# Patient Record
Sex: FEMALE | Race: WHITE
[De-identification: names, ages, dates, MRNs, and addresses within clinical notes are randomized per-mention and may not be internally consistent; named-entity substitution may affect disease eponyms.]

---

## 2017-03-15 ENCOUNTER — HOSPITAL ENCOUNTER (OUTPATIENT)
Dept: HOSPITAL 84 - D.CT | Age: 37
Discharge: HOME | End: 2017-03-15
Attending: FAMILY MEDICINE
Payer: MEDICARE

## 2017-03-15 VITALS — BODY MASS INDEX: 13.1 KG/M2

## 2017-03-15 DIAGNOSIS — R93.8: Primary | ICD-10-CM

## 2017-03-26 ENCOUNTER — HOSPITAL ENCOUNTER (EMERGENCY)
Dept: HOSPITAL 84 - D.ER | Age: 37
Discharge: HOME | End: 2017-03-26
Payer: MEDICARE

## 2017-03-26 VITALS — BODY MASS INDEX: 13.1 KG/M2

## 2017-03-26 DIAGNOSIS — E87.6: ICD-10-CM

## 2017-03-26 DIAGNOSIS — K52.9: Primary | ICD-10-CM

## 2017-03-26 LAB
ALBUMIN SERPL-MCNC: 4.5 G/DL (ref 3.4–5)
ALP SERPL-CCNC: 74 U/L (ref 46–116)
ALT SERPL-CCNC: 20 U/L (ref 10–68)
ANION GAP SERPL CALC-SCNC: 16.2 MMOL/L (ref 8–16)
APPEARANCE UR: (no result)
BACTERIA #/AREA URNS HPF: (no result) /HPF
BASOPHILS NFR BLD AUTO: 0.1 % (ref 0–2)
BILIRUB SERPL-MCNC: 1.91 MG/DL (ref 0.2–1.3)
BILIRUB SERPL-MCNC: NEGATIVE MG/DL
BUN SERPL-MCNC: 13 MG/DL (ref 7–18)
CALCIUM SERPL-MCNC: 9.2 MG/DL (ref 8.5–10.1)
CHLORIDE SERPL-SCNC: 103 MMOL/L (ref 98–107)
CO2 SERPL-SCNC: 26.2 MMOL/L (ref 21–32)
COLOR UR: YELLOW
CREAT SERPL-MCNC: 0.8 MG/DL (ref 0.6–1.3)
EOSINOPHIL NFR BLD: 0 % (ref 0–7)
ERYTHROCYTE [DISTWIDTH] IN BLOOD BY AUTOMATED COUNT: 12.5 % (ref 11.5–14.5)
GLOBULIN SER-MCNC: 3.3 G/L
GLUCOSE SERPL-MCNC: 148 MG/DL (ref 74–106)
GLUCOSE SERPL-MCNC: NEGATIVE MG/DL
HCG UR QL: NEGATIVE
HCT VFR BLD CALC: 39.2 % (ref 36–48)
HGB BLD-MCNC: 13.1 G/DL (ref 12–16)
IMM GRANULOCYTES NFR BLD: 0.2 % (ref 0–5)
KETONES UR STRIP-MCNC: (no result) MG/DL
LEUKOCYTE ESTERASE: (no result)
LYMPHOCYTES NFR BLD AUTO: 4.9 % (ref 15–50)
MCH RBC QN AUTO: 29.8 PG (ref 26–34)
MCHC RBC AUTO-ENTMCNC: 33.4 G/DL (ref 31–37)
MCV RBC: 89.1 FL (ref 80–100)
MONOCYTES NFR BLD: 2.4 % (ref 2–11)
MUCOUS THREADS #/AREA URNS LPF: (no result) /LPF
NEUTROPHILS NFR BLD AUTO: 92.4 % (ref 40–80)
NITRITE UR-MCNC: NEGATIVE MG/ML
OSMOLALITY SERPL CALC.SUM OF ELEC: 285 MOSM/KG (ref 275–300)
PH UR STRIP: 6 [PH] (ref 5–6)
PLATELET # BLD: 203 10X3/UL (ref 130–400)
PMV BLD AUTO: 13 FL (ref 7.4–10.4)
POTASSIUM SERPL-SCNC: 3.4 MMOL/L (ref 3.5–5.1)
PROT SERPL-MCNC: 7.8 G/DL (ref 6.4–8.2)
PROT UR-MCNC: (no result) MG/DL
RBC # BLD AUTO: 4.4 10X6/UL (ref 4–5.4)
RBC #/AREA URNS HPF: (no result) /HPF (ref 0–5)
SODIUM SERPL-SCNC: 142 MMOL/L (ref 136–145)
SP GR UR STRIP: 1.02 (ref 1–1.02)
UROBILINOGEN UR-MCNC: NORMAL MG/DL
WBC # BLD AUTO: 14 10X3/UL (ref 4.8–10.8)
WBC #/AREA URNS HPF: (no result) /HPF (ref 0–5)

## 2018-02-14 ENCOUNTER — HOSPITAL ENCOUNTER (EMERGENCY)
Dept: HOSPITAL 84 - D.ER | Age: 38
Discharge: HOME | End: 2018-02-14
Payer: MEDICARE

## 2018-02-14 VITALS — BODY MASS INDEX: 13.1 KG/M2

## 2018-02-14 DIAGNOSIS — J06.9: Primary | ICD-10-CM

## 2018-02-14 DIAGNOSIS — J20.9: ICD-10-CM

## 2018-02-18 ENCOUNTER — HOSPITAL ENCOUNTER (EMERGENCY)
Dept: HOSPITAL 84 - D.ER | Age: 38
Discharge: HOME | End: 2018-02-18
Payer: MEDICARE

## 2018-02-18 VITALS — BODY MASS INDEX: 13.1 KG/M2

## 2018-02-18 DIAGNOSIS — J11.1: Primary | ICD-10-CM

## 2018-02-18 LAB
ERYTHROCYTE [DISTWIDTH] IN BLOOD BY AUTOMATED COUNT: 12.8 % (ref 11.5–14.5)
HCT VFR BLD CALC: 40.4 % (ref 36–48)
HGB BLD-MCNC: 13.8 G/DL (ref 12–16)
LYMPHOCYTES NFR BLD AUTO: 20.6 % (ref 15–50)
MCH RBC QN AUTO: 29.7 PG (ref 26–34)
MCHC RBC AUTO-ENTMCNC: 34.2 G/DL (ref 31–37)
MCV RBC: 86.9 FL (ref 80–100)
NEUTROPHILS NFR BLD AUTO: 69.6 % (ref 40–80)
PLATELET # BLD: 114 10X3/UL (ref 130–400)
PMV BLD AUTO: 12.5 FL (ref 7.4–10.4)
RBC # BLD AUTO: 4.65 10X6/UL (ref 4–5.4)
WBC # BLD AUTO: 5.5 10X3/UL (ref 4.8–10.8)

## 2018-11-03 ENCOUNTER — HOSPITAL ENCOUNTER (EMERGENCY)
Dept: HOSPITAL 84 - D.ER | Age: 38
LOS: 1 days | Discharge: HOME | End: 2018-11-04
Payer: MEDICARE

## 2018-11-03 VITALS — BODY MASS INDEX: 14.84 KG/M2 | HEIGHT: 69 IN | WEIGHT: 100.21 LBS

## 2018-11-03 DIAGNOSIS — R11.2: ICD-10-CM

## 2018-11-03 DIAGNOSIS — K52.9: Primary | ICD-10-CM

## 2018-11-03 LAB
BASOPHILS NFR BLD AUTO: 0 % (ref 0–2)
EOSINOPHIL NFR BLD: 0 % (ref 0–7)
ERYTHROCYTE [DISTWIDTH] IN BLOOD BY AUTOMATED COUNT: 12.3 % (ref 11.5–14.5)
HCT VFR BLD CALC: 41.1 % (ref 36–48)
HGB BLD-MCNC: 14.1 G/DL (ref 12–16)
IMM GRANULOCYTES NFR BLD: 0.3 % (ref 0–5)
LYMPHOCYTES NFR BLD AUTO: 3.5 % (ref 15–50)
MCH RBC QN AUTO: 30.4 PG (ref 26–34)
MCHC RBC AUTO-ENTMCNC: 34.3 G/DL (ref 31–37)
MCV RBC: 88.6 FL (ref 80–100)
MONOCYTES NFR BLD: 2.2 % (ref 2–11)
NEUTROPHILS NFR BLD AUTO: 94 % (ref 40–80)
PLATELET # BLD: 187 10X3/UL (ref 130–400)
PMV BLD AUTO: 12.6 FL (ref 7.4–10.4)
RBC # BLD AUTO: 4.64 10X6/UL (ref 4–5.4)
WBC # BLD AUTO: 13.4 10X3/UL (ref 4.8–10.8)

## 2018-11-04 ENCOUNTER — HOSPITAL ENCOUNTER (EMERGENCY)
Dept: HOSPITAL 84 - D.ER | Age: 38
Discharge: HOME | End: 2018-11-04
Payer: MEDICARE

## 2018-11-04 VITALS — HEIGHT: 69 IN | BODY MASS INDEX: 14.54 KG/M2 | WEIGHT: 98.2 LBS

## 2018-11-04 VITALS — DIASTOLIC BLOOD PRESSURE: 68 MMHG | SYSTOLIC BLOOD PRESSURE: 105 MMHG

## 2018-11-04 VITALS — SYSTOLIC BLOOD PRESSURE: 121 MMHG | DIASTOLIC BLOOD PRESSURE: 71 MMHG

## 2018-11-04 DIAGNOSIS — R11.2: Primary | ICD-10-CM

## 2018-11-04 DIAGNOSIS — F12.188: ICD-10-CM

## 2018-11-04 LAB
ALBUMIN SERPL-MCNC: 4.4 G/DL (ref 3.4–5)
ALBUMIN SERPL-MCNC: 4.7 G/DL (ref 3.4–5)
ALP SERPL-CCNC: 68 U/L (ref 46–116)
ALP SERPL-CCNC: 69 U/L (ref 46–116)
ALT SERPL-CCNC: 30 U/L (ref 10–68)
ALT SERPL-CCNC: 36 U/L (ref 10–68)
AMORPHOUS SEDIMENT: (no result) /LPF
ANION GAP SERPL CALC-SCNC: 17.1 MMOL/L (ref 8–16)
ANION GAP SERPL CALC-SCNC: 18.1 MMOL/L (ref 8–16)
APPEARANCE UR: (no result)
APPEARANCE UR: CLEAR
APTT BLD: 26.1 SECONDS (ref 22.8–39.4)
BACTERIA #/AREA URNS HPF: (no result) /HPF
BACTERIA #/AREA URNS HPF: (no result) /HPF
BASOPHILS NFR BLD AUTO: 0.2 % (ref 0–2)
BILIRUB SERPL-MCNC: 1.6 MG/DL (ref 0.2–1.3)
BILIRUB SERPL-MCNC: 2.5 MG/DL (ref 0.2–1.3)
BILIRUB SERPL-MCNC: NEGATIVE MG/DL
BILIRUB SERPL-MCNC: NEGATIVE MG/DL
BUN SERPL-MCNC: 14 MG/DL (ref 7–18)
BUN SERPL-MCNC: 15 MG/DL (ref 7–18)
CALCIUM SERPL-MCNC: 9.2 MG/DL (ref 8.5–10.1)
CALCIUM SERPL-MCNC: 9.3 MG/DL (ref 8.5–10.1)
CHLORIDE SERPL-SCNC: 103 MMOL/L (ref 98–107)
CHLORIDE SERPL-SCNC: 104 MMOL/L (ref 98–107)
CO2 SERPL-SCNC: 22.5 MMOL/L (ref 21–32)
CO2 SERPL-SCNC: 24.3 MMOL/L (ref 21–32)
COLOR UR: (no result)
COLOR UR: YELLOW
CREAT SERPL-MCNC: 0.8 MG/DL (ref 0.6–1.3)
CREAT SERPL-MCNC: 1 MG/DL (ref 0.6–1.3)
EOSINOPHIL NFR BLD: 0 % (ref 0–7)
ERYTHROCYTE [DISTWIDTH] IN BLOOD BY AUTOMATED COUNT: 12.5 % (ref 11.5–14.5)
GLOBULIN SER-MCNC: 3.5 G/L
GLOBULIN SER-MCNC: 3.6 G/L
GLUCOSE SERPL-MCNC: 120 MG/DL (ref 74–106)
GLUCOSE SERPL-MCNC: 178 MG/DL (ref 74–106)
GLUCOSE SERPL-MCNC: NEGATIVE MG/DL
GLUCOSE SERPL-MCNC: NEGATIVE MG/DL
HCG SERPL-ACNC: NEGATIVE M[IU]/ML
HCT VFR BLD CALC: 41.1 % (ref 36–48)
HGB BLD-MCNC: 14 G/DL (ref 12–16)
IMM GRANULOCYTES NFR BLD: 0.2 % (ref 0–5)
INR PPP: 1.17 (ref 0.85–1.17)
KETONES UR STRIP-MCNC: (no result) MG/DL
KETONES UR STRIP-MCNC: (no result) MG/DL
LYMPHOCYTES NFR BLD AUTO: 6.9 % (ref 15–50)
MCH RBC QN AUTO: 30.5 PG (ref 26–34)
MCHC RBC AUTO-ENTMCNC: 34.1 G/DL (ref 31–37)
MCV RBC: 89.5 FL (ref 80–100)
MONOCYTES NFR BLD: 4.6 % (ref 2–11)
MUCOUS THREADS #/AREA URNS LPF: (no result) /LPF
NEUTROPHILS NFR BLD AUTO: 88.1 % (ref 40–80)
NITRITE UR-MCNC: NEGATIVE MG/ML
NITRITE UR-MCNC: NEGATIVE MG/ML
OSMOLALITY SERPL CALC.SUM OF ELEC: 283 MOSM/KG (ref 275–300)
OSMOLALITY SERPL CALC.SUM OF ELEC: 284 MOSM/KG (ref 275–300)
PH UR STRIP: 5 [PH] (ref 5–6)
PH UR STRIP: 5 [PH] (ref 5–6)
PLATELET # BLD: 210 10X3/UL (ref 130–400)
PMV BLD AUTO: 12.7 FL (ref 7.4–10.4)
POTASSIUM SERPL-SCNC: 3.4 MMOL/L (ref 3.5–5.1)
POTASSIUM SERPL-SCNC: 3.6 MMOL/L (ref 3.5–5.1)
PROT SERPL-MCNC: 8 G/DL (ref 6.4–8.2)
PROT SERPL-MCNC: 8.2 G/DL (ref 6.4–8.2)
PROT UR-MCNC: (no result) MG/DL
PROT UR-MCNC: NEGATIVE MG/DL
PROTHROMBIN TIME: 14.5 SECONDS (ref 11.6–15)
RBC # BLD AUTO: 4.59 10X6/UL (ref 4–5.4)
RBC #/AREA URNS HPF: (no result) /HPF (ref 0–5)
RBC #/AREA URNS HPF: (no result) /HPF (ref 0–5)
SODIUM SERPL-SCNC: 140 MMOL/L (ref 136–145)
SODIUM SERPL-SCNC: 142 MMOL/L (ref 136–145)
SP GR UR STRIP: 1.01 (ref 1–1.02)
SP GR UR STRIP: 1.03 (ref 1–1.02)
SQUAMOUS #/AREA URNS HPF: (no result) /HPF (ref 0–5)
SQUAMOUS #/AREA URNS HPF: (no result) /HPF (ref 0–5)
UROBILINOGEN UR-MCNC: NORMAL MG/DL
UROBILINOGEN UR-MCNC: NORMAL MG/DL
WBC # BLD AUTO: 13 10X3/UL (ref 4.8–10.8)
WBC #/AREA URNS HPF: (no result) /HPF (ref 0–5)
WBC #/AREA URNS HPF: (no result) /HPF (ref 0–5)

## 2020-03-18 ENCOUNTER — HOSPITAL ENCOUNTER (EMERGENCY)
Dept: HOSPITAL 84 - D.ER | Age: 40
Discharge: HOME | End: 2020-03-18
Payer: MEDICARE

## 2020-03-18 VITALS — DIASTOLIC BLOOD PRESSURE: 83 MMHG | SYSTOLIC BLOOD PRESSURE: 141 MMHG

## 2020-03-18 VITALS
HEIGHT: 69 IN | BODY MASS INDEX: 17.81 KG/M2 | BODY MASS INDEX: 17.81 KG/M2 | WEIGHT: 120.25 LBS | WEIGHT: 120.25 LBS | HEIGHT: 69 IN

## 2020-03-18 DIAGNOSIS — R10.11: ICD-10-CM

## 2020-03-18 DIAGNOSIS — R19.7: ICD-10-CM

## 2020-03-18 DIAGNOSIS — R11.2: Primary | ICD-10-CM

## 2020-03-18 DIAGNOSIS — A08.4: ICD-10-CM

## 2020-03-18 LAB
ALBUMIN SERPL-MCNC: 4.6 G/DL (ref 3.4–5)
ALP SERPL-CCNC: 97 U/L (ref 30–120)
ALT SERPL-CCNC: 19 U/L (ref 10–68)
AMYLASE SERPL-CCNC: 75 U/L (ref 25–115)
ANION GAP SERPL CALC-SCNC: 17.7 MMOL/L (ref 8–16)
BACTERIA #/AREA URNS HPF: (no result) /HPF
BASOPHILS NFR BLD AUTO: 0.1 % (ref 0–2)
BILIRUB SERPL-MCNC: 2.12 MG/DL (ref 0.2–1.3)
BILIRUB SERPL-MCNC: NEGATIVE MG/DL
BUN SERPL-MCNC: 19 MG/DL (ref 7–18)
CALCIUM SERPL-MCNC: 9.2 MG/DL (ref 8.5–10.1)
CHLORIDE SERPL-SCNC: 104 MMOL/L (ref 98–107)
CO2 SERPL-SCNC: 21.8 MMOL/L (ref 21–32)
CREAT SERPL-MCNC: 0.9 MG/DL (ref 0.6–1.3)
EOSINOPHIL NFR BLD: 0 % (ref 0–7)
ERYTHROCYTE [DISTWIDTH] IN BLOOD BY AUTOMATED COUNT: 12.9 % (ref 11.5–14.5)
GLOBULIN SER-MCNC: 3.4 G/L
GLUCOSE SERPL-MCNC: 124 MG/DL (ref 74–106)
GLUCOSE SERPL-MCNC: NEGATIVE MG/DL
HCG SERPL-ACNC: 0 MIU/ML
HCT VFR BLD CALC: 40.6 % (ref 36–48)
HGB BLD-MCNC: 13.7 G/DL (ref 12–16)
IMM GRANULOCYTES NFR BLD: 0.2 % (ref 0–5)
KETONES UR STRIP-MCNC: (no result) MG/DL
LIPASE SERPL-CCNC: 62 U/L (ref 73–393)
LYMPHOCYTES NFR BLD AUTO: 5.9 % (ref 15–50)
MCH RBC QN AUTO: 29.8 PG (ref 26–34)
MCHC RBC AUTO-ENTMCNC: 33.7 G/DL (ref 31–37)
MCV RBC: 88.3 FL (ref 80–100)
MONOCYTES NFR BLD: 7 % (ref 2–11)
NEUTROPHILS NFR BLD AUTO: 86.8 % (ref 40–80)
NITRITE UR-MCNC: NEGATIVE MG/ML
OSMOLALITY SERPL CALC.SUM OF ELEC: 281 MOSM/KG (ref 275–300)
PH UR STRIP: 5 [PH] (ref 5–6)
PLATELET # BLD: 247 10X3/UL (ref 130–400)
PMV BLD AUTO: 12.2 FL (ref 7.4–10.4)
POTASSIUM SERPL-SCNC: 3.5 MMOL/L (ref 3.5–5.1)
PROT SERPL-MCNC: 8 G/DL (ref 6.4–8.2)
RBC # BLD AUTO: 4.6 10X6/UL (ref 4–5.4)
RBC #/AREA URNS HPF: (no result) /HPF (ref 0–5)
SODIUM SERPL-SCNC: 140 MMOL/L (ref 136–145)
SP GR UR STRIP: 1.02 (ref 1–1.02)
SQUAMOUS #/AREA URNS HPF: (no result) /HPF (ref 0–5)
UROBILINOGEN UR-MCNC: NORMAL MG/DL
WBC # BLD AUTO: 13.3 10X3/UL (ref 4.8–10.8)
WBC #/AREA URNS HPF: (no result) /HPF

## 2020-03-19 ENCOUNTER — HOSPITAL ENCOUNTER (OUTPATIENT)
Dept: HOSPITAL 84 - D.ER | Age: 40
Setting detail: OBSERVATION
LOS: 1 days | Discharge: HOME | End: 2020-03-20
Attending: FAMILY MEDICINE | Admitting: FAMILY MEDICINE
Payer: MEDICARE

## 2020-03-19 VITALS — DIASTOLIC BLOOD PRESSURE: 78 MMHG | SYSTOLIC BLOOD PRESSURE: 121 MMHG

## 2020-03-19 VITALS
HEIGHT: 69 IN | BODY MASS INDEX: 17.81 KG/M2 | WEIGHT: 120.25 LBS | HEIGHT: 69 IN | BODY MASS INDEX: 17.81 KG/M2 | WEIGHT: 120.25 LBS

## 2020-03-19 VITALS — DIASTOLIC BLOOD PRESSURE: 68 MMHG | SYSTOLIC BLOOD PRESSURE: 128 MMHG

## 2020-03-19 VITALS — DIASTOLIC BLOOD PRESSURE: 64 MMHG | SYSTOLIC BLOOD PRESSURE: 109 MMHG

## 2020-03-19 VITALS — SYSTOLIC BLOOD PRESSURE: 140 MMHG | DIASTOLIC BLOOD PRESSURE: 89 MMHG

## 2020-03-19 VITALS — DIASTOLIC BLOOD PRESSURE: 78 MMHG | SYSTOLIC BLOOD PRESSURE: 111 MMHG

## 2020-03-19 DIAGNOSIS — K52.9: Primary | ICD-10-CM

## 2020-03-19 DIAGNOSIS — F12.90: ICD-10-CM

## 2020-03-19 DIAGNOSIS — R11.2: ICD-10-CM

## 2020-03-19 DIAGNOSIS — E86.0: ICD-10-CM

## 2020-03-19 LAB
ALBUMIN SERPL-MCNC: 4.7 G/DL (ref 3.4–5)
ALP SERPL-CCNC: 98 U/L (ref 30–120)
ALT SERPL-CCNC: 18 U/L (ref 10–68)
ANION GAP SERPL CALC-SCNC: 15.7 MMOL/L (ref 8–16)
BASOPHILS NFR BLD AUTO: 0.1 % (ref 0–2)
BILIRUB SERPL-MCNC: 2.42 MG/DL (ref 0.2–1.3)
BUN SERPL-MCNC: 23 MG/DL (ref 7–18)
CALCIUM SERPL-MCNC: 9.2 MG/DL (ref 8.5–10.1)
CHLORIDE SERPL-SCNC: 104 MMOL/L (ref 98–107)
CO2 SERPL-SCNC: 22.7 MMOL/L (ref 21–32)
CREAT SERPL-MCNC: 1.1 MG/DL (ref 0.6–1.3)
EOSINOPHIL NFR BLD: 0.1 % (ref 0–7)
ERYTHROCYTE [DISTWIDTH] IN BLOOD BY AUTOMATED COUNT: 12.8 % (ref 11.5–14.5)
GLOBULIN SER-MCNC: 3.4 G/L
GLUCOSE SERPL-MCNC: 159 MG/DL (ref 74–106)
HCT VFR BLD CALC: 42.5 % (ref 36–48)
HGB BLD-MCNC: 14.3 G/DL (ref 12–16)
IMM GRANULOCYTES NFR BLD: 0.1 % (ref 0–5)
LYMPHOCYTES NFR BLD AUTO: 5.9 % (ref 15–50)
MCH RBC QN AUTO: 29.9 PG (ref 26–34)
MCHC RBC AUTO-ENTMCNC: 33.6 G/DL (ref 31–37)
MCV RBC: 88.9 FL (ref 80–100)
MONOCYTES NFR BLD: 5.8 % (ref 2–11)
NEUTROPHILS NFR BLD AUTO: 88 % (ref 40–80)
OSMOLALITY SERPL CALC.SUM OF ELEC: 284 MOSM/KG (ref 275–300)
PLATELET # BLD: 272 10X3/UL (ref 130–400)
PMV BLD AUTO: 12.4 FL (ref 7.4–10.4)
POTASSIUM SERPL-SCNC: 3.4 MMOL/L (ref 3.5–5.1)
PROT SERPL-MCNC: 8.1 G/DL (ref 6.4–8.2)
RBC # BLD AUTO: 4.78 10X6/UL (ref 4–5.4)
SODIUM SERPL-SCNC: 139 MMOL/L (ref 136–145)
WBC # BLD AUTO: 14.1 10X3/UL (ref 4.8–10.8)

## 2020-03-19 NOTE — NUR
RCVED PT FROM ER, COMPLAINING OF N/V BUT STATES IT BETTER SINCE SHE RCVED
MEDS IN THE ER. DENIES NEEDS AT THIS TIME, WILL CONT TO MONITOR.

## 2020-03-19 NOTE — NUR
PT LYING IN BE AWAKE ALERT AND ORIENTED x4. NO SIGNS OR SYMPTOMS OF DISTRESS
NOTED. RESPIRATIONS EVEN AND UNLABORED. NO COMPLAINTS AT THIS TIME. PT STATED
LAST BOWELL MOVEMENT WAS 3/18/20. CALL LIGHT WITH IN REACH AND BED IS IN
LOWEST POSITON. PT ENCOURAGED TO CALL FOR HELP WHEN NEEDED. WILL CONTIUE TO
MONITOR

## 2020-03-19 NOTE — NUR
PT REQUEST TO TAKE SHOWER. IV SALINE LOCKED AND COVERED. NO SIGNS OF DISTRESS.
PT INSTRUCTED HOW TO USE BATHROOM CALL LIGHT. GAIT IS STEADY. WILL CONTINUE TO
MONITOR.

## 2020-03-20 VITALS — SYSTOLIC BLOOD PRESSURE: 129 MMHG | DIASTOLIC BLOOD PRESSURE: 78 MMHG

## 2020-03-20 VITALS — DIASTOLIC BLOOD PRESSURE: 57 MMHG | SYSTOLIC BLOOD PRESSURE: 100 MMHG

## 2020-03-20 VITALS — DIASTOLIC BLOOD PRESSURE: 79 MMHG | SYSTOLIC BLOOD PRESSURE: 121 MMHG

## 2020-03-20 VITALS — SYSTOLIC BLOOD PRESSURE: 110 MMHG | DIASTOLIC BLOOD PRESSURE: 61 MMHG

## 2020-03-20 LAB
ANION GAP SERPL CALC-SCNC: 17.1 MMOL/L (ref 8–16)
BASOPHILS NFR BLD AUTO: 0.2 % (ref 0–2)
BUN SERPL-MCNC: 18 MG/DL (ref 7–18)
CALCIUM SERPL-MCNC: 8.4 MG/DL (ref 8.5–10.1)
CHLORIDE SERPL-SCNC: 111 MMOL/L (ref 98–107)
CO2 SERPL-SCNC: 23.5 MMOL/L (ref 21–32)
CREAT SERPL-MCNC: 0.8 MG/DL (ref 0.6–1.3)
EOSINOPHIL NFR BLD: 0.1 % (ref 0–7)
ERYTHROCYTE [DISTWIDTH] IN BLOOD BY AUTOMATED COUNT: 12.9 % (ref 11.5–14.5)
GLUCOSE SERPL-MCNC: 89 MG/DL (ref 74–106)
HCT VFR BLD CALC: 35.5 % (ref 36–48)
HGB BLD-MCNC: 11.4 G/DL (ref 12–16)
IMM GRANULOCYTES NFR BLD: 0.2 % (ref 0–5)
LYMPHOCYTES NFR BLD AUTO: 27.6 % (ref 15–50)
MCH RBC QN AUTO: 29.4 PG (ref 26–34)
MCHC RBC AUTO-ENTMCNC: 32.1 G/DL (ref 31–37)
MCV RBC: 91.5 FL (ref 80–100)
MONOCYTES NFR BLD: 10.6 % (ref 2–11)
NEUTROPHILS NFR BLD AUTO: 61.3 % (ref 40–80)
OSMOLALITY SERPL CALC.SUM OF ELEC: 294 MOSM/KG (ref 275–300)
PLATELET # BLD: 180 10X3/UL (ref 130–400)
PMV BLD AUTO: 12.3 FL (ref 7.4–10.4)
POTASSIUM SERPL-SCNC: 3.6 MMOL/L (ref 3.5–5.1)
RBC # BLD AUTO: 3.88 10X6/UL (ref 4–5.4)
SODIUM SERPL-SCNC: 148 MMOL/L (ref 136–145)
WBC # BLD AUTO: 10.1 10X3/UL (ref 4.8–10.8)

## 2020-03-20 NOTE — NUR
ALL DISCHARGE INSTRUCTIONS COVERED WITH PT AND PT FRIEND AT BEDSIDE. PIV
REMOVED FROM RIGHT FA WITH CATHETER TIP INTACT. DRESSING APPLIED. PT DENIES
FURTHER QUESTIONS/CONCERNS/NEEDS AT THIS TIME. ALL DISCHARGE PAPERS SIGNED.
SIGNED DISCHARGE PAPERS PLACED IN PT CHART. PT TO NOTIFY NURSE WHEN READY FOR
TRANSPORT FROM ROOM.

## 2020-03-20 NOTE — NUR
ALERT AND ORIENTED. NO C/O PAIN. NO S/S OF ACUTE DISTRESS NOTED. STRICT I&O.
IV TO LEFT HAND, NS INFUSING @ 100ML/HR. SITE PATENT WITHOUT REDNESS OR
SWELLING. DENIES ANY NEEDS AT THIS TIME. CALL LIGHT IN REACH. WILL CONTINUE TO
MONITOR.

## 2020-03-20 NOTE — NUR
PRN PAIN MEDICATION AND COMPAZINE GIVEN. FOR PAIN LEVEL 7/10. PT TOLERATING
WELL NO SIGNS OF DISTRESS NOTED. CALL LIGHT WITH IN REACH AND BED IS IN LOWEST
POSITON. WILL CONTINUE TO MOITOR.

## 2020-03-20 NOTE — NUR
PT TRANSPORTED FROM ROOM VIA WHEELCHAIR AND ESCORTED BY THIS NURSE OUT FOR
TRANSPORT HOME. PT DENIES FURTHER QUESTIONS/CONCERNS/NEEDS. PT STATES THAT SHE
DOES HAVE ALL PERSONAL BELONGINGS.

## 2020-03-20 NOTE — NUR
IV TO LEFT HAND INFILTRATED. DISCONTINUED IV, CATHETER TIP INTACT. RESITED IV
TO RIGHT FOREARM, 22 GA X STICK WITH GOOD BLOOD RETURN. APPLIED WARM PACK TO
LEFT HAND. CALL LIGHT IN REACH. WILL CONTINUE TO MONITOR.

## 2020-03-20 NOTE — NUR
prn pain medication and compazinegiven for pain and neasua. pt encouraged to
call for help when getting in and out of bed. call light with in reach
will continue to monitor

## 2020-03-21 NOTE — MORECARE
CASE MANAGEMENT DISCHARGE SUMMARY
 
 
PATIENT: JAS EMJÍA                     UNIT: [id number]
ACCOUNT#: [id number]                       ADM DATE: 20
AGE: 39     : 84  SEX: F            ROOM/BED: D.[facility]    
AUTHOR: AMADO BAINS                             PHYSICIAN:                               
 
REFERRING PHYSICIAN: TEODORO CHAPMAN MD                
DATE OF SERVICE: 20
Discharge Plan
 
 
Patient Name: JAS MEJÍA
Facility: [facility]:[place]
Encounter #: [id number]
Medical Record #: [id number]
: 1980
Planned Disposition: 
Anticipated Discharge Date: 
 
Discharge Date: 2020
Expected LOS: 
Initial Reviewer: [id number]
Initial Review Date: 2020
Generated: 3/21/20   1:50 pm 
  
 
 
 
 
 
 
Coverage Notice
 
Reviewer: [id number] Grace Stone
 
Notice Issued Date-Time: 2020  16:13
Notice Type: Medicare Outpatient Observation Notice
 
Notice Delivered To: Patient
Relationship to Patient: Self
Representative Name: 
 
Delivery Method: HAND - Hand Delivered
Rosa Days:
Prior Verbal Notification: 
 
Recipient Understood Notice: Yes
Recipient Signature: Yes
 
Med Rec Note Co-signed by Attending:
 
Coverage Notice Comment:  MOON explained, signed, given, copy placed in MR
Patient Name: JAS MEJÍA
Encounter #: [id number]
Page [phone]
 
 
 
 
 
Electronically Signed by AMADO BAINS on 20 at 1250
 
 
 
 
 
 
**All edits/amendments must be made on the electronic document**
 
DICTATION DATE: 20 1250     : DM  20 1250     
RPT#: [id number]                                DC DATE:20
                                               STATUS: DIS IN  
[address] [place] [address]
***END OF REPORT***

## 2020-04-22 ENCOUNTER — HOSPITAL ENCOUNTER (EMERGENCY)
Dept: HOSPITAL 84 - D.ER | Age: 40
Discharge: LEFT BEFORE DISCHARGE | End: 2020-04-22
Payer: MEDICARE

## 2020-04-22 VITALS — BODY MASS INDEX: 17.7 KG/M2

## 2020-04-22 DIAGNOSIS — R19.7: ICD-10-CM

## 2020-04-22 DIAGNOSIS — R11.2: Primary | ICD-10-CM

## 2020-04-23 ENCOUNTER — HOSPITAL ENCOUNTER (EMERGENCY)
Dept: HOSPITAL 84 - D.ER | Age: 40
Discharge: HOME | End: 2020-04-23
Payer: MEDICARE

## 2020-04-23 VITALS
BODY MASS INDEX: 17.81 KG/M2 | HEIGHT: 69 IN | BODY MASS INDEX: 17.81 KG/M2 | WEIGHT: 120.25 LBS | WEIGHT: 120.25 LBS | HEIGHT: 69 IN

## 2020-04-23 VITALS — DIASTOLIC BLOOD PRESSURE: 77 MMHG | SYSTOLIC BLOOD PRESSURE: 116 MMHG

## 2020-04-23 DIAGNOSIS — R11.2: Primary | ICD-10-CM

## 2020-04-23 DIAGNOSIS — R10.9: ICD-10-CM

## 2020-04-23 LAB
ALBUMIN SERPL-MCNC: 5.1 G/DL (ref 3.4–5)
ALP SERPL-CCNC: 107 U/L (ref 30–120)
ALT SERPL-CCNC: 22 U/L (ref 10–68)
AMORPHOUS SEDIMENT: (no result) /LPF
AMYLASE SERPL-CCNC: 126 U/L (ref 25–115)
ANION GAP SERPL CALC-SCNC: 22 MMOL/L (ref 8–16)
BACTERIA #/AREA URNS HPF: (no result) /HPF
BASOPHILS NFR BLD AUTO: 0.1 % (ref 0–2)
BILIRUB SERPL-MCNC: 1.8 MG/DL (ref 0.2–1.3)
BILIRUB SERPL-MCNC: NEGATIVE MG/DL
BUN SERPL-MCNC: 18 MG/DL (ref 7–18)
CALCIUM SERPL-MCNC: 9.7 MG/DL (ref 8.5–10.1)
CHLORIDE SERPL-SCNC: 103 MMOL/L (ref 98–107)
CO2 SERPL-SCNC: 21.6 MMOL/L (ref 21–32)
CREAT SERPL-MCNC: 1.1 MG/DL (ref 0.6–1.3)
EOSINOPHIL NFR BLD: 0 % (ref 0–7)
ERYTHROCYTE [DISTWIDTH] IN BLOOD BY AUTOMATED COUNT: 12.8 % (ref 11.5–14.5)
GLOBULIN SER-MCNC: 3.4 G/L
GLUCOSE SERPL-MCNC: 148 MG/DL (ref 74–106)
GLUCOSE SERPL-MCNC: 50 MG/DL
HCG UR QL: NEGATIVE
HCT VFR BLD CALC: 42.2 % (ref 36–48)
HGB BLD-MCNC: 14.2 G/DL (ref 12–16)
IMM GRANULOCYTES NFR BLD: 0.3 % (ref 0–5)
KETONES UR STRIP-MCNC: (no result) MG/DL
LIPASE SERPL-CCNC: 59 U/L (ref 73–393)
LYMPHOCYTES NFR BLD AUTO: 6 % (ref 15–50)
MCH RBC QN AUTO: 29.8 PG (ref 26–34)
MCHC RBC AUTO-ENTMCNC: 33.6 G/DL (ref 31–37)
MCV RBC: 88.5 FL (ref 80–100)
MONOCYTES NFR BLD: 5.8 % (ref 2–11)
NEUTROPHILS NFR BLD AUTO: 87.8 % (ref 40–80)
NITRITE UR-MCNC: NEGATIVE MG/ML
OSMOLALITY SERPL CALC.SUM OF ELEC: 289 MOSM/KG (ref 275–300)
PH UR STRIP: 5 [PH] (ref 5–6)
PLATELET # BLD: 258 10X3/UL (ref 130–400)
PMV BLD AUTO: 12 FL (ref 7.4–10.4)
POTASSIUM SERPL-SCNC: 3.6 MMOL/L (ref 3.5–5.1)
PROT SERPL-MCNC: 8.5 G/DL (ref 6.4–8.2)
RBC # BLD AUTO: 4.77 10X6/UL (ref 4–5.4)
RBC #/AREA URNS HPF: (no result) /HPF (ref 0–5)
SODIUM SERPL-SCNC: 143 MMOL/L (ref 136–145)
SP GR UR STRIP: 1.02 (ref 1–1.02)
SQUAMOUS #/AREA URNS HPF: (no result) /HPF (ref 0–5)
TROPONIN I SERPL-MCNC: < 0.017 NG/ML (ref 0–0.06)
UROBILINOGEN UR-MCNC: NORMAL MG/DL
WBC # BLD AUTO: 11.6 10X3/UL (ref 4.8–10.8)
WBC #/AREA URNS HPF: (no result) /HPF

## 2020-04-24 ENCOUNTER — HOSPITAL ENCOUNTER (INPATIENT)
Dept: HOSPITAL 84 - D.ER | Age: 40
LOS: 1 days | Discharge: HOME | DRG: 394 | End: 2020-04-25
Attending: INTERNAL MEDICINE | Admitting: INTERNAL MEDICINE
Payer: MEDICARE

## 2020-04-24 VITALS — BODY MASS INDEX: 17.77 KG/M2 | HEIGHT: 69 IN | WEIGHT: 120 LBS | BODY MASS INDEX: 17.77 KG/M2

## 2020-04-24 VITALS — SYSTOLIC BLOOD PRESSURE: 138 MMHG | DIASTOLIC BLOOD PRESSURE: 81 MMHG

## 2020-04-24 VITALS — SYSTOLIC BLOOD PRESSURE: 132 MMHG | DIASTOLIC BLOOD PRESSURE: 87 MMHG

## 2020-04-24 VITALS — DIASTOLIC BLOOD PRESSURE: 69 MMHG | SYSTOLIC BLOOD PRESSURE: 118 MMHG

## 2020-04-24 VITALS — DIASTOLIC BLOOD PRESSURE: 72 MMHG | SYSTOLIC BLOOD PRESSURE: 114 MMHG

## 2020-04-24 DIAGNOSIS — N39.0: ICD-10-CM

## 2020-04-24 DIAGNOSIS — F12.90: ICD-10-CM

## 2020-04-24 DIAGNOSIS — E87.6: ICD-10-CM

## 2020-04-24 DIAGNOSIS — R11.15: Primary | ICD-10-CM

## 2020-04-24 LAB
ALBUMIN SERPL-MCNC: 4.4 G/DL (ref 3.4–5)
ALP SERPL-CCNC: 89 U/L (ref 30–120)
ALT SERPL-CCNC: 18 U/L (ref 10–68)
AMPHETAMINES UR QL SCN: NEGATIVE QUAL
AMYLASE SERPL-CCNC: 71 U/L (ref 25–115)
ANION GAP SERPL CALC-SCNC: 18.6 MMOL/L (ref 8–16)
BACTERIA #/AREA URNS HPF: (no result) /HPF
BARBITURATES UR QL SCN: NEGATIVE QUAL
BASOPHILS NFR BLD AUTO: 0.3 % (ref 0–2)
BENZODIAZ UR QL SCN: NEGATIVE QUAL
BILIRUB SERPL-MCNC: 2.86 MG/DL (ref 0.2–1.3)
BILIRUB SERPL-MCNC: NEGATIVE MG/DL
BUN SERPL-MCNC: 16 MG/DL (ref 7–18)
BZE UR QL SCN: NEGATIVE QUAL
CALCIUM SERPL-MCNC: 9 MG/DL (ref 8.5–10.1)
CANNABINOIDS UR QL SCN: POSITIVE QUAL
CHLORIDE SERPL-SCNC: 103 MMOL/L (ref 98–107)
CO2 SERPL-SCNC: 21.3 MMOL/L (ref 21–32)
CREAT SERPL-MCNC: 1 MG/DL (ref 0.6–1.3)
EOSINOPHIL NFR BLD: 0.1 % (ref 0–7)
ERYTHROCYTE [DISTWIDTH] IN BLOOD BY AUTOMATED COUNT: 12.7 % (ref 11.5–14.5)
GLOBULIN SER-MCNC: 3.1 G/L
GLUCOSE SERPL-MCNC: 141 MG/DL (ref 74–106)
GLUCOSE SERPL-MCNC: NEGATIVE MG/DL
HCG UR QL: NEGATIVE
HCT VFR BLD CALC: 39.3 % (ref 36–48)
HGB BLD-MCNC: 12.9 G/DL (ref 12–16)
IMM GRANULOCYTES NFR BLD: 0.3 % (ref 0–5)
KETONES UR STRIP-MCNC: (no result) MG/DL
LIPASE SERPL-CCNC: 62 U/L (ref 73–393)
LYMPHOCYTES NFR BLD AUTO: 24.1 % (ref 15–50)
MCH RBC QN AUTO: 29.2 PG (ref 26–34)
MCHC RBC AUTO-ENTMCNC: 32.8 G/DL (ref 31–37)
MCV RBC: 88.9 FL (ref 80–100)
MONOCYTES NFR BLD: 11.2 % (ref 2–11)
NEUTROPHILS NFR BLD AUTO: 64 % (ref 40–80)
NITRITE UR-MCNC: NEGATIVE MG/ML
OPIATES UR QL SCN: POSITIVE QUAL
OSMOLALITY SERPL CALC.SUM OF ELEC: 281 MOSM/KG (ref 275–300)
PCP UR QL SCN: NEGATIVE QUAL
PH UR STRIP: 5 [PH] (ref 5–6)
PLATELET # BLD: 270 10X3/UL (ref 130–400)
PMV BLD AUTO: 11.9 FL (ref 7.4–10.4)
POTASSIUM SERPL-SCNC: 2.9 MMOL/L (ref 3.5–5.1)
PROT SERPL-MCNC: 7.5 G/DL (ref 6.4–8.2)
RBC # BLD AUTO: 4.42 10X6/UL (ref 4–5.4)
RBC #/AREA URNS HPF: (no result) /HPF (ref 0–5)
SODIUM SERPL-SCNC: 140 MMOL/L (ref 136–145)
SP GR UR STRIP: 1.02 (ref 1–1.02)
SQUAMOUS #/AREA URNS HPF: (no result) /HPF (ref 0–5)
TROPONIN I SERPL-MCNC: < 0.017 NG/ML (ref 0–0.06)
UROBILINOGEN UR-MCNC: 1 MG/DL
WBC # BLD AUTO: 11.7 10X3/UL (ref 4.8–10.8)
WBC #/AREA URNS HPF: (no result) /HPF

## 2020-04-24 NOTE — NUR
PT TO ROOM FROM ER VIA WHEELCHAIR.  COMPLAINTS OF NAUSEA AND PAIN ON ARRIVAL.
IVF INFUSING, K-RIDER INFUSING, ZOFRAN DRIP INFUSING.

## 2020-04-25 VITALS — DIASTOLIC BLOOD PRESSURE: 67 MMHG | SYSTOLIC BLOOD PRESSURE: 109 MMHG

## 2020-04-25 VITALS — DIASTOLIC BLOOD PRESSURE: 79 MMHG | SYSTOLIC BLOOD PRESSURE: 119 MMHG

## 2020-04-25 LAB
ALBUMIN SERPL-MCNC: 4 G/DL (ref 3.4–5)
ALP SERPL-CCNC: 80 U/L (ref 30–120)
ALT SERPL-CCNC: 17 U/L (ref 10–68)
ANION GAP SERPL CALC-SCNC: 14.9 MMOL/L (ref 8–16)
BASOPHILS NFR BLD AUTO: 0.2 % (ref 0–2)
BILIRUB SERPL-MCNC: 2.25 MG/DL (ref 0.2–1.3)
BUN SERPL-MCNC: 10 MG/DL (ref 7–18)
CALCIUM SERPL-MCNC: 8.2 MG/DL (ref 8.5–10.1)
CHLORIDE SERPL-SCNC: 104 MMOL/L (ref 98–107)
CO2 SERPL-SCNC: 22.1 MMOL/L (ref 21–32)
CREAT SERPL-MCNC: 0.7 MG/DL (ref 0.6–1.3)
EOSINOPHIL NFR BLD: 0.3 % (ref 0–7)
ERYTHROCYTE [DISTWIDTH] IN BLOOD BY AUTOMATED COUNT: 12.8 % (ref 11.5–14.5)
GLOBULIN SER-MCNC: 2.9 G/L
GLUCOSE SERPL-MCNC: 113 MG/DL (ref 74–106)
HCT VFR BLD CALC: 34.9 % (ref 36–48)
HCV AB S/CO SERPL IA: <0.1 S/CO RAT (ref 0–0.9)
HGB BLD-MCNC: 11.5 G/DL (ref 12–16)
IMM GRANULOCYTES NFR BLD: 0.2 % (ref 0–5)
LYMPHOCYTES NFR BLD AUTO: 21.9 % (ref 15–50)
MCH RBC QN AUTO: 29.3 PG (ref 26–34)
MCHC RBC AUTO-ENTMCNC: 33 G/DL (ref 31–37)
MCV RBC: 89 FL (ref 80–100)
MONOCYTES NFR BLD: 10.5 % (ref 2–11)
NEUTROPHILS NFR BLD AUTO: 66.9 % (ref 40–80)
OSMOLALITY SERPL CALC.SUM OF ELEC: 275 MOSM/KG (ref 275–300)
PLATELET # BLD: 205 10X3/UL (ref 130–400)
PMV BLD AUTO: 12.1 FL (ref 7.4–10.4)
POTASSIUM SERPL-SCNC: 3 MMOL/L (ref 3.5–5.1)
PROT SERPL-MCNC: 6.9 G/DL (ref 6.4–8.2)
RBC # BLD AUTO: 3.92 10X6/UL (ref 4–5.4)
SODIUM SERPL-SCNC: 138 MMOL/L (ref 136–145)
WBC # BLD AUTO: 9.3 10X3/UL (ref 4.8–10.8)

## 2020-04-25 NOTE — MORECARE
CASE MANAGEMENT DISCHARGE SUMMARY
 
 
PATIENT: JAS MEJÍA                     UNIT: [id number]
ACCOUNT#: [id number]                       ADM DATE: 20
AGE: 40     : 84  SEX: F            ROOM/BED: D.210    
AUTHOR: HERSON,DOC                             PHYSICIAN:                               
 
REFERRING PHYSICIAN: YADIRA ALTMAN MD               
DATE OF SERVICE: 20
Discharge Plan
 
 
Patient Name: JAS MEJÍA
Facility: [facility]:[place]
Encounter #: [id number]
Medical Record #: [id number]
: 1980
Planned Disposition: Home or Self Care
Anticipated Discharge Date: 
 
Discharge Date: 
Expected LOS: 
Initial Reviewer: [email or web address]
Initial Review Date: 2020
Generated: 20  11:39 am 
Comments
 
DCP- Discharge Planning
 
Updated by [id number]: Nidia Rich on 20   9:39 am CT
Patient Name: JAS MEJÍA                                     
Admission Status: ER   
Accout number: [id number]                              
Admission Date: 2020   
: 1980                                                        
Admission Diagnosis:   
Attending: YADIRA ALTMAN                                                
Current LOS:  1   
  
Anticipated DC Date:    
Planned Disposition: Home or Self Care   
Primary Insurance: [employer] ADV   
  
  
Discharge Planning Comments:   
CM spoke with patient about dc planning needs. Patient states that she is 
independent with her care at home. She denies any needs or concerns. Her 
spouse will be her  home. She does not use any DME and feels safe to DC 
home today. CM will assist as needed  
 
  
  
  
  
  
: Nidia Rich
 DCPIA - Discharge Planning Initial Assessment
 
Updated by [id number]: Nidia Rich on 20  10:38 am
*  Is the patient Alert and Oriented?
Yes
*  How many steps to enter\exit or inside your home? STAIRS *  PCP [facility] *  Pharmacy
[facility]
*  Preadmission Environment
Home with Family
*  ADLs
Independent
*  Equipment
None
*  List name and contact numbers for known caregivers / representatives who 
currently or will assist patient after discharge:
JANETTE MEJÍA (SPOUSE) [phone]
*  Verbal permission to speak to the caregivers and representatives has been 
obtained from the patient.
N/A
*  Community resources currently utilized
None
*  Additional services required to return to the preadmission environment?
No
*  Can the patient safely return to the preadmission environment?
Yes
*  Has this patient been hospitalized within the prior 30 days at any 
hospital?
No
 
 
 
 
 
 
Patient Name: JAS MEJÍA
 
Encounter #: [id number]
Page [phone]
 
 
 
 
 
Electronically Signed by AMADO BAINS on 20 at 1040
 
 
 
 
 
 
**All edits/amendments must be made on the electronic document**
 
DICTATION DATE: 20 103     : JUSTYNA  20 1039     
RPT#: [id number]                                DC DATE:        
                                               STATUS: ADM IN  
[facility]
[address] [place] [address]
***END OF REPORT***

## 2020-04-25 NOTE — NUR
PT RESTING, NO COMPLAINTS OR CONCERNS. HOPEFULL TO GO HOME TODAY, STATES SHE'S
FEELING BETTER. ALL QUESTIONS ANSWERED TO THE BEST OF MY ABILITY. CL IN REACH,
SRX2.

## 2020-04-25 NOTE — NUR
INFORMED PT THAT SHE HAD A DISCHARGE ORDER. PT IS HAPPY WITH THIS, DISCONECTED
I/V. PT TOLERATING WELL.

## 2020-04-26 NOTE — MORECARE
CASE MANAGEMENT DISCHARGE SUMMARY
 
 
PATIENT: JAS MEJÍA                     UNIT: [id number]
ACCOUNT#: [id number]                       ADM DATE: 20
AGE: 40     : 84  SEX: F            ROOM/BED: D.2108    
AUTHOR: HERSONDOC                             PHYSICIAN:                               
 
REFERRING PHYSICIAN: YADIRA ALTMAN MD               
DATE OF SERVICE: 20
Discharge Plan
 
 
Patient Name: JAS MEJÍA
Facility: [facility]:[place]
Encounter #: [id number]
Medical Record #: [id number]
: 1980
Planned Disposition: Home or Self Care
Anticipated Discharge Date: 
 
Discharge Date: 2020
Expected LOS: 0
Initial Reviewer: [id number]
Initial Review Date: 2020
Generated: 20   2:14 pm 
Comments
 
DCP- Discharge Planning
 
Updated by [id number]: Nidia Rich on 20   9:39 am CT
Patient Name: JAS MEJÍA                                     
Admission Status: ER   
Accout number: [id number]                              
Admission Date: 2020   
: 1980                                                        
Admission Diagnosis:   
Attending: YADIRA ALTMAN                                                
Current LOS:  1   
  
Anticipated DC Date:    
Planned Disposition: Home or Self Care   
Primary Insurance: [employer] ADV   
  
  
Discharge Planning Comments:   
CM spoke with patient about dc planning needs. Patient states that she is 
independent with her care at home. She denies any needs or concerns. Her 
spouse will be her  home. She does not use any DME and feels safe to DC 
home today. CM will assist as needed  
 
  
  
  
  
  
: Nidia Rich
 DCPIA - Discharge Planning Initial Assessment
 
Updated by [id number]: Nidia Rich on 20  10:38 am
*  Is the patient Alert and Oriented?
Yes
*  How many steps to enter\exit or inside your home? STAIRS *  PCP [facility] *  Pharmacy
[facility]
*  Preadmission Environment
Home with Family
*  ADLs
Independent
*  Equipment
None
*  List name and contact numbers for known caregivers / representatives who 
currently or will assist patient after discharge:
JANETTE MEJÍA (SPOUSE) [phone]
*  Verbal permission to speak to the caregivers and representatives has been 
obtained from the patient.
N/A
*  Community resources currently utilized
None
*  Additional services required to return to the preadmission environment?
No
*  Can the patient safely return to the preadmission environment?
Yes
*  Has this patient been hospitalized within the prior 30 days at any 
hospital?
No
 
 
 
 
 
 
 
Last DP export: 20   9:40 a
Patient Name: JAS MEJÍA
 
Encounter #: [id number]
Page [id number]
 
 
 
 
 
Electronically Signed by AMADO BAINS on 20 at 1315
 
 
 
 
 
 
**All edits/amendments must be made on the electronic document**
 
DICTATION DATE: 20 1314     : JUSTYNA  20 1314     
RPT#: [id number]                                DC DATE:20
                                               STATUS: DIS IN  
[facility]
 [place] [address]
***END OF REPORT***

## 2020-05-10 ENCOUNTER — HOSPITAL ENCOUNTER (OUTPATIENT)
Dept: HOSPITAL 84 - D.ER | Age: 40
Setting detail: OBSERVATION
LOS: 1 days | Discharge: LEFT BEFORE DISCHARGE | End: 2020-05-11
Attending: INTERNAL MEDICINE | Admitting: INTERNAL MEDICINE
Payer: MEDICARE

## 2020-05-10 VITALS
HEIGHT: 69 IN | BODY MASS INDEX: 17.81 KG/M2 | WEIGHT: 120.25 LBS | BODY MASS INDEX: 17.81 KG/M2 | HEIGHT: 69 IN | WEIGHT: 120.25 LBS

## 2020-05-10 VITALS — SYSTOLIC BLOOD PRESSURE: 134 MMHG | DIASTOLIC BLOOD PRESSURE: 90 MMHG

## 2020-05-10 VITALS — DIASTOLIC BLOOD PRESSURE: 85 MMHG | SYSTOLIC BLOOD PRESSURE: 141 MMHG

## 2020-05-10 VITALS — DIASTOLIC BLOOD PRESSURE: 90 MMHG | SYSTOLIC BLOOD PRESSURE: 134 MMHG

## 2020-05-10 DIAGNOSIS — F19.90: ICD-10-CM

## 2020-05-10 DIAGNOSIS — R11.15: Primary | ICD-10-CM

## 2020-05-10 DIAGNOSIS — E87.6: ICD-10-CM

## 2020-05-10 DIAGNOSIS — N39.0: ICD-10-CM

## 2020-05-10 LAB
ALBUMIN SERPL-MCNC: 4.8 G/DL (ref 3.4–5)
ALP SERPL-CCNC: 104 U/L (ref 30–120)
ALT SERPL-CCNC: 22 U/L (ref 10–68)
AMORPHOUS SEDIMENT: (no result) /LPF
AMPHETAMINES UR QL SCN: NEGATIVE QUAL
ANION GAP SERPL CALC-SCNC: 21.2 MMOL/L (ref 8–16)
BACTERIA #/AREA URNS HPF: (no result) /HPF
BARBITURATES UR QL SCN: NEGATIVE QUAL
BENZODIAZ UR QL SCN: NEGATIVE QUAL
BILIRUB SERPL-MCNC: 1.74 MG/DL (ref 0.2–1.3)
BILIRUB SERPL-MCNC: NEGATIVE MG/DL
BUN SERPL-MCNC: 12 MG/DL (ref 7–18)
BZE UR QL SCN: NEGATIVE QUAL
CALCIUM SERPL-MCNC: 9.3 MG/DL (ref 8.5–10.1)
CANNABINOIDS UR QL SCN: POSITIVE QUAL
CHLORIDE SERPL-SCNC: 104 MMOL/L (ref 98–107)
CO2 SERPL-SCNC: 17.5 MMOL/L (ref 21–32)
CREAT SERPL-MCNC: 0.9 MG/DL (ref 0.6–1.3)
ERYTHROCYTE [DISTWIDTH] IN BLOOD BY AUTOMATED COUNT: 13.1 % (ref 11.5–14.5)
GLOBULIN SER-MCNC: 3.6 G/L
GLUCOSE SERPL-MCNC: 168 MG/DL (ref 74–106)
GLUCOSE SERPL-MCNC: 50 MG/DL
GRAN CASTS #/AREA URNS LPF: (no result) /LPF
HCT VFR BLD CALC: 45.6 % (ref 36–48)
HGB BLD-MCNC: 14.9 G/DL (ref 12–16)
HYALINE CASTS #/AREA URNS LPF: (no result) /LPF
KETONES UR STRIP-MCNC: (no result) MG/DL
LYMPHOCYTES NFR BLD AUTO: 4.3 % (ref 15–50)
MCH RBC QN AUTO: 28.8 PG (ref 26–34)
MCHC RBC AUTO-ENTMCNC: 32.7 G/DL (ref 31–37)
MCV RBC: 88.2 FL (ref 80–100)
NEUTROPHILS NFR BLD AUTO: 92.8 % (ref 40–80)
NITRITE UR-MCNC: NEGATIVE MG/ML
OPIATES UR QL SCN: NEGATIVE QUAL
OSMOLALITY SERPL CALC.SUM OF ELEC: 281 MOSM/KG (ref 275–300)
PCP UR QL SCN: NEGATIVE QUAL
PH UR STRIP: 5 [PH] (ref 5–6)
PLATELET # BLD: 224 10X3/UL (ref 130–400)
PMV BLD AUTO: 11.8 FL (ref 7.4–10.4)
POTASSIUM SERPL-SCNC: 3.7 MMOL/L (ref 3.5–5.1)
PROT SERPL-MCNC: 8.4 G/DL (ref 6.4–8.2)
RBC # BLD AUTO: 5.17 10X6/UL (ref 4–5.4)
RBC #/AREA URNS HPF: (no result) /HPF (ref 0–5)
SODIUM SERPL-SCNC: 139 MMOL/L (ref 136–145)
SP GR UR STRIP: 1.02 (ref 1–1.02)
SQUAMOUS #/AREA URNS HPF: (no result) /HPF (ref 0–5)
UROBILINOGEN UR-MCNC: NORMAL MG/DL
WBC # BLD AUTO: 11.1 10X3/UL (ref 4.8–10.8)
WBC #/AREA URNS HPF: (no result) /HPF

## 2020-05-10 NOTE — NUR
RECEIVED PATIENT FROM ER. ALERT AND ORIENTED. C/O ABDOMINAL PAIN. NO S/S OF
ACUTE DISTRESS NOTED. UP AD HARJIT. 20GA PERIPHERAL IV TO LEFT WRIST, NS INFUSING
@ 75ML/HR AND ZOFRAN @ 4.7ML/HR. SITE PATENT WITHOUT REDNESS OR SWELLING. ON
CLEAR LIQUID DIET. POSITIVE FOR THC. NPO AFTER MN, GASTRIC EMPTYING SCAN
SCHEDULED FOR TOMORROW. DENIES ANY NEEDS AT THIS TIME. CALL LIGHT IN REACH.
WILL CONTINUE TO MONITOR.

## 2020-05-10 NOTE — NUR
RESTING IN BED WITH EYES OPEN WATCHING TV. NO C/O PAIN. NO S/S OF ACUTE
DISTRESS NOTED. DENIES ANY NEEDS AT THIS TIME. CALL LIGHT IN REACH. WILL
CONTINUE TO MONITOR.

## 2020-05-10 NOTE — NUR
PT C/O ABDOMINAL PAIN 8/10. PAGED TYRA JACKSON. GAVE DILAUDID 0.5 MG IV PUSH PER
TELEPHONE ORDER. NO OTHER NEEDS. WILL REASSESS AND CONTINUE TO MONITOR.

## 2020-05-11 VITALS — SYSTOLIC BLOOD PRESSURE: 110 MMHG | DIASTOLIC BLOOD PRESSURE: 71 MMHG

## 2020-05-11 VITALS — SYSTOLIC BLOOD PRESSURE: 116 MMHG | DIASTOLIC BLOOD PRESSURE: 66 MMHG

## 2020-05-11 VITALS — DIASTOLIC BLOOD PRESSURE: 80 MMHG | SYSTOLIC BLOOD PRESSURE: 138 MMHG

## 2020-05-11 VITALS — SYSTOLIC BLOOD PRESSURE: 105 MMHG | DIASTOLIC BLOOD PRESSURE: 72 MMHG

## 2020-05-11 LAB
ALBUMIN SERPL-MCNC: 4 G/DL (ref 3.4–5)
ALP SERPL-CCNC: 86 U/L (ref 30–120)
ALT SERPL-CCNC: 16 U/L (ref 10–68)
ANION GAP SERPL CALC-SCNC: 13.6 MMOL/L (ref 8–16)
BILIRUB SERPL-MCNC: 1.88 MG/DL (ref 0.2–1.3)
BUN SERPL-MCNC: 13 MG/DL (ref 7–18)
CALCIUM SERPL-MCNC: 8.9 MG/DL (ref 8.5–10.1)
CHLORIDE SERPL-SCNC: 105 MMOL/L (ref 98–107)
CO2 SERPL-SCNC: 24.1 MMOL/L (ref 21–32)
CREAT SERPL-MCNC: 0.9 MG/DL (ref 0.6–1.3)
ERYTHROCYTE [DISTWIDTH] IN BLOOD BY AUTOMATED COUNT: 13 % (ref 11.5–14.5)
GLOBULIN SER-MCNC: 3.2 G/L
GLUCOSE SERPL-MCNC: 116 MG/DL (ref 74–106)
HCT VFR BLD CALC: 39.7 % (ref 36–48)
HGB BLD-MCNC: 13 G/DL (ref 12–16)
LYMPHOCYTES NFR BLD AUTO: 15.4 % (ref 15–50)
MCH RBC QN AUTO: 29.2 PG (ref 26–34)
MCHC RBC AUTO-ENTMCNC: 32.7 G/DL (ref 31–37)
MCV RBC: 89.2 FL (ref 80–100)
NEUTROPHILS NFR BLD AUTO: 78.6 % (ref 40–80)
OSMOLALITY SERPL CALC.SUM OF ELEC: 278 MOSM/KG (ref 275–300)
PLATELET # BLD: 185 10X3/UL (ref 130–400)
PMV BLD AUTO: 11.9 FL (ref 7.4–10.4)
POTASSIUM SERPL-SCNC: 3.7 MMOL/L (ref 3.5–5.1)
PROT SERPL-MCNC: 7.2 G/DL (ref 6.4–8.2)
RBC # BLD AUTO: 4.45 10X6/UL (ref 4–5.4)
SODIUM SERPL-SCNC: 139 MMOL/L (ref 136–145)
WBC # BLD AUTO: 10.9 10X3/UL (ref 4.8–10.8)

## 2020-05-11 NOTE — NUR
PATIENT SPEAKING WITH TYRA RAYMOND IN ROOM. REQUESTING TO SIGN AMA. AMA
SIGNED. APN AWARE. HOUSE SUPERVISOR MADE AWARE OF SITUATION. WILL COMPLETE
CSSTARS.

## 2020-05-11 NOTE — NUR
SPOKE WITH NUCLEAR MEDICINE. STATES CANNOT DO GASTRIC EMPTYING UNTIL 1500.
STATES OK FOR PATIENT TO HAVE WATER TO SIP ON BUT CANNOT HAVE A LOT. STATES
NOT TO GIVE PATIENT ANY PAIN MEDICATION. PATIENT EDUCATION PROVIDED AND SMALL
CUP WATER GIVEN TO PATIENT. VERBALIZED UNDERSTANDING.

## 2020-05-11 NOTE — NUR
REQUESTING TO SPEAK WITH APN ABOUT GASTRIC EMPTYING. STATES DOES NOT WANT TO
WAIT UNTIL 1500. STATES WILL TALK TO APN AND DECIDE IF WANTS TO HAVE TEST
DONE. APN ON FLOOR AND NOTIFIED. STATES WILL SEE PATIENT.

## 2020-05-11 NOTE — NUR
CSSTARS COMPLETE AND FAXED TO HOUSE SUPERVISOR. COPY PLACED IN NURSING
SUPERVISORS MAIL BOX. IV REMOVED FROM PATIENT'S LEFT WRIST WITH TIP INTACT.
PATIENT LEFT AMA WITH ALL BELONGINGS.

## 2020-05-11 NOTE — NUR
ALERT AND ORIENTED. LUNGS CLEAR BILATERALLY. HEART SOUNDS S1 AND S2 HEARD IN
ALL FIELDS. BOWEL SOUNDS ACTIVE X 4. SKIN INTACT WITHOUT REDNESS. IV TO LEFT
WRIST PATENT WITHOUT REDNESS. DENIES PAIN. DENIES NEEDS. BED LOW. CALL [place]
AND PERSONAL ITEMS IN REACH. WILL CONTINUE TO MONITOR.

## 2020-06-28 ENCOUNTER — HOSPITAL ENCOUNTER (EMERGENCY)
Dept: HOSPITAL 84 - D.ER | Age: 40
Discharge: HOME | End: 2020-06-28
Payer: MEDICARE

## 2020-06-28 VITALS
BODY MASS INDEX: 17.81 KG/M2 | WEIGHT: 120.25 LBS | HEIGHT: 69 IN | HEIGHT: 69 IN | WEIGHT: 120.25 LBS | BODY MASS INDEX: 17.81 KG/M2

## 2020-06-28 VITALS — DIASTOLIC BLOOD PRESSURE: 70 MMHG | SYSTOLIC BLOOD PRESSURE: 132 MMHG

## 2020-06-28 DIAGNOSIS — R11.15: Primary | ICD-10-CM

## 2020-06-28 DIAGNOSIS — M79.7: ICD-10-CM

## 2020-06-28 LAB
ALBUMIN SERPL-MCNC: 4.7 G/DL (ref 3.4–5)
ALP SERPL-CCNC: 91 U/L (ref 30–120)
ALT SERPL-CCNC: 19 U/L (ref 10–68)
AMYLASE SERPL-CCNC: 273 U/L (ref 25–115)
ANION GAP SERPL CALC-SCNC: 14 MMOL/L (ref 8–16)
BACTERIA #/AREA URNS HPF: (no result) /HPF
BASOPHILS NFR BLD AUTO: 0.1 % (ref 0–2)
BILIRUB SERPL-MCNC: 2.85 MG/DL (ref 0.2–1.3)
BILIRUB SERPL-MCNC: NEGATIVE MG/DL
BUN SERPL-MCNC: 16 MG/DL (ref 7–18)
CALCIUM SERPL-MCNC: 9.2 MG/DL (ref 8.5–10.1)
CHLORIDE SERPL-SCNC: 104 MMOL/L (ref 98–107)
CO2 SERPL-SCNC: 24.5 MMOL/L (ref 21–32)
CREAT SERPL-MCNC: 0.9 MG/DL (ref 0.6–1.3)
EOSINOPHIL NFR BLD: 0.1 % (ref 0–7)
ERYTHROCYTE [DISTWIDTH] IN BLOOD BY AUTOMATED COUNT: 13 % (ref 11.5–14.5)
GLOBULIN SER-MCNC: 3.1 G/L
GLUCOSE SERPL-MCNC: 116 MG/DL (ref 74–106)
GLUCOSE SERPL-MCNC: NEGATIVE MG/DL
HCG UR QL: NEGATIVE
HCT VFR BLD CALC: 40.1 % (ref 36–48)
HGB BLD-MCNC: 13.4 G/DL (ref 12–16)
IMM GRANULOCYTES NFR BLD: 0.3 % (ref 0–5)
KETONES UR STRIP-MCNC: (no result) MG/DL
LIPASE SERPL-CCNC: 76 U/L (ref 73–393)
LYMPHOCYTES NFR BLD AUTO: 9.3 % (ref 15–50)
MCH RBC QN AUTO: 29.5 PG (ref 26–34)
MCHC RBC AUTO-ENTMCNC: 33.4 G/DL (ref 31–37)
MCV RBC: 88.3 FL (ref 80–100)
MONOCYTES NFR BLD: 8.9 % (ref 2–11)
NEUTROPHILS NFR BLD AUTO: 81.3 % (ref 40–80)
NITRITE UR-MCNC: NEGATIVE MG/ML
OSMOLALITY SERPL CALC.SUM OF ELEC: 279 MOSM/KG (ref 275–300)
PH UR STRIP: 5 [PH] (ref 5–6)
PLATELET # BLD: 238 10X3/UL (ref 130–400)
PMV BLD AUTO: 11.9 FL (ref 7.4–10.4)
POTASSIUM SERPL-SCNC: 3.5 MMOL/L (ref 3.5–5.1)
PROT SERPL-MCNC: 7.8 G/DL (ref 6.4–8.2)
RBC # BLD AUTO: 4.54 10X6/UL (ref 4–5.4)
RBC #/AREA URNS HPF: (no result) /HPF (ref 0–5)
SODIUM SERPL-SCNC: 139 MMOL/L (ref 136–145)
SP GR UR STRIP: 1.02 (ref 1–1.02)
SQUAMOUS #/AREA URNS HPF: (no result) /HPF (ref 0–5)
TROPONIN I SERPL-MCNC: < 0.017 NG/ML (ref 0–0.06)
UROBILINOGEN UR-MCNC: NORMAL MG/DL
WBC # BLD AUTO: 12 10X3/UL (ref 4.8–10.8)
WBC #/AREA URNS HPF: (no result) /HPF

## 2020-07-12 ENCOUNTER — HOSPITAL ENCOUNTER (EMERGENCY)
Dept: HOSPITAL 84 - D.ER | Age: 40
Discharge: HOME | End: 2020-07-12
Payer: MEDICARE

## 2020-07-12 VITALS — DIASTOLIC BLOOD PRESSURE: 75 MMHG | SYSTOLIC BLOOD PRESSURE: 128 MMHG

## 2020-07-12 VITALS
WEIGHT: 120.25 LBS | WEIGHT: 120.25 LBS | BODY MASS INDEX: 17.81 KG/M2 | HEIGHT: 69 IN | BODY MASS INDEX: 17.81 KG/M2 | HEIGHT: 69 IN

## 2020-07-12 DIAGNOSIS — R11.15: ICD-10-CM

## 2020-07-12 DIAGNOSIS — R07.0: ICD-10-CM

## 2020-07-12 DIAGNOSIS — N39.0: Primary | ICD-10-CM

## 2020-07-12 LAB
ALBUMIN SERPL-MCNC: 4.5 G/DL (ref 3.4–5)
ALP SERPL-CCNC: 96 U/L (ref 30–120)
ALT SERPL-CCNC: 37 U/L (ref 10–68)
AMYLASE SERPL-CCNC: 244 U/L (ref 25–115)
ANION GAP SERPL CALC-SCNC: 15.8 MMOL/L (ref 8–16)
BACTERIA #/AREA URNS HPF: (no result) /HPF
BILIRUB SERPL-MCNC: 2.26 MG/DL (ref 0.2–1.3)
BILIRUB SERPL-MCNC: NEGATIVE MG/DL
BUN SERPL-MCNC: 11 MG/DL (ref 7–18)
CALCIUM SERPL-MCNC: 9.6 MG/DL (ref 8.5–10.1)
CHLORIDE SERPL-SCNC: 101 MMOL/L (ref 98–107)
CO2 SERPL-SCNC: 24 MMOL/L (ref 21–32)
CREAT SERPL-MCNC: 1 MG/DL (ref 0.6–1.3)
ERYTHROCYTE [DISTWIDTH] IN BLOOD BY AUTOMATED COUNT: 13 % (ref 11.5–14.5)
GLOBULIN SER-MCNC: 3.6 G/L
GLUCOSE SERPL-MCNC: 149 MG/DL (ref 74–106)
GLUCOSE SERPL-MCNC: NEGATIVE MG/DL
HCG SERPL-ACNC: NEGATIVE M[IU]/ML
HCT VFR BLD CALC: 41.9 % (ref 36–48)
HGB BLD-MCNC: 13.9 G/DL (ref 12–16)
KETONES UR STRIP-MCNC: (no result) MG/DL
LIPASE SERPL-CCNC: 61 U/L (ref 73–393)
LYMPHOCYTES NFR BLD AUTO: 6 % (ref 15–50)
MAGNESIUM SERPL-MCNC: 2.2 MG/DL (ref 1.8–2.4)
MCH RBC QN AUTO: 29.4 PG (ref 26–34)
MCHC RBC AUTO-ENTMCNC: 33.2 G/DL (ref 31–37)
MCV RBC: 88.6 FL (ref 80–100)
NEUTROPHILS NFR BLD AUTO: 85.8 % (ref 40–80)
NITRITE UR-MCNC: NEGATIVE MG/ML
OSMOLALITY SERPL CALC.SUM OF ELEC: 275 MOSM/KG (ref 275–300)
PH UR STRIP: 6 [PH] (ref 5–6)
PLATELET # BLD: 238 10X3/UL (ref 130–400)
PMV BLD AUTO: 11.3 FL (ref 7.4–10.4)
POTASSIUM SERPL-SCNC: 3.8 MMOL/L (ref 3.5–5.1)
PROT SERPL-MCNC: 8.1 G/DL (ref 6.4–8.2)
RBC # BLD AUTO: 4.73 10X6/UL (ref 4–5.4)
RBC #/AREA URNS HPF: (no result) /HPF (ref 0–5)
SODIUM SERPL-SCNC: 137 MMOL/L (ref 136–145)
SP GR UR STRIP: 1.02 (ref 1–1.02)
TROPONIN I SERPL-MCNC: < 0.017 NG/ML (ref 0–0.06)
UROBILINOGEN UR-MCNC: NORMAL MG/DL
WBC # BLD AUTO: 10.8 10X3/UL (ref 4.8–10.8)
WBC #/AREA URNS HPF: (no result) /HPF

## 2020-07-13 ENCOUNTER — HOSPITAL ENCOUNTER (OUTPATIENT)
Dept: HOSPITAL 84 - D.ER | Age: 40
Setting detail: OBSERVATION
LOS: 2 days | Discharge: HOME | End: 2020-07-15
Attending: FAMILY MEDICINE | Admitting: FAMILY MEDICINE
Payer: MEDICARE

## 2020-07-13 VITALS — DIASTOLIC BLOOD PRESSURE: 66 MMHG | SYSTOLIC BLOOD PRESSURE: 102 MMHG

## 2020-07-13 VITALS
BODY MASS INDEX: 18.11 KG/M2 | BODY MASS INDEX: 18.11 KG/M2 | WEIGHT: 122.3 LBS | HEIGHT: 69 IN | BODY MASS INDEX: 18.11 KG/M2 | WEIGHT: 122.3 LBS | HEIGHT: 69 IN

## 2020-07-13 VITALS — DIASTOLIC BLOOD PRESSURE: 67 MMHG | SYSTOLIC BLOOD PRESSURE: 107 MMHG

## 2020-07-13 VITALS — DIASTOLIC BLOOD PRESSURE: 71 MMHG | SYSTOLIC BLOOD PRESSURE: 125 MMHG

## 2020-07-13 VITALS — SYSTOLIC BLOOD PRESSURE: 115 MMHG | DIASTOLIC BLOOD PRESSURE: 70 MMHG

## 2020-07-13 VITALS — DIASTOLIC BLOOD PRESSURE: 74 MMHG | SYSTOLIC BLOOD PRESSURE: 112 MMHG

## 2020-07-13 DIAGNOSIS — E87.6: ICD-10-CM

## 2020-07-13 DIAGNOSIS — E80.6: ICD-10-CM

## 2020-07-13 DIAGNOSIS — R11.15: Primary | ICD-10-CM

## 2020-07-13 DIAGNOSIS — E86.0: ICD-10-CM

## 2020-07-13 DIAGNOSIS — N39.0: ICD-10-CM

## 2020-07-13 LAB
ALBUMIN SERPL-MCNC: 4.5 G/DL (ref 3.4–5)
ALP SERPL-CCNC: 90 U/L (ref 30–120)
ALT SERPL-CCNC: 34 U/L (ref 10–68)
AMYLASE SERPL-CCNC: 190 U/L (ref 25–115)
ANION GAP SERPL CALC-SCNC: 16.7 MMOL/L (ref 8–16)
APTT BLD: 27.6 SECONDS (ref 22.8–39.4)
BACTERIA #/AREA URNS HPF: (no result) /HPF
BILIRUB SERPL-MCNC: 3.19 MG/DL (ref 0.2–1.3)
BILIRUB SERPL-MCNC: NEGATIVE MG/DL
BUN SERPL-MCNC: 18 MG/DL (ref 7–18)
CALCIUM SERPL-MCNC: 9.4 MG/DL (ref 8.5–10.1)
CHLORIDE SERPL-SCNC: 104 MMOL/L (ref 98–107)
CO2 SERPL-SCNC: 23.5 MMOL/L (ref 21–32)
CREAT SERPL-MCNC: 0.8 MG/DL (ref 0.6–1.3)
ERYTHROCYTE [DISTWIDTH] IN BLOOD BY AUTOMATED COUNT: 13.2 % (ref 11.5–14.5)
GLOBULIN SER-MCNC: 3.2 G/L
GLUCOSE SERPL-MCNC: 127 MG/DL (ref 74–106)
GLUCOSE SERPL-MCNC: NEGATIVE MG/DL
HCG UR QL: NEGATIVE
HCT VFR BLD CALC: 40.9 % (ref 36–48)
HGB BLD-MCNC: 13.5 G/DL (ref 12–16)
INR PPP: 1.07 (ref 0.85–1.17)
KETONES UR STRIP-MCNC: (no result) MG/DL
LIPASE SERPL-CCNC: 65 U/L (ref 73–393)
LYMPHOCYTES NFR BLD AUTO: 12.4 % (ref 15–50)
MCH RBC QN AUTO: 29.3 PG (ref 26–34)
MCHC RBC AUTO-ENTMCNC: 33 G/DL (ref 31–37)
MCV RBC: 88.7 FL (ref 80–100)
NEUTROPHILS NFR BLD AUTO: 77.2 % (ref 40–80)
NITRITE UR-MCNC: NEGATIVE MG/ML
OSMOLALITY SERPL CALC.SUM OF ELEC: 284 MOSM/KG (ref 275–300)
PH UR STRIP: 5 [PH] (ref 5–6)
PLATELET # BLD: 239 10X3/UL (ref 130–400)
PMV BLD AUTO: 11.2 FL (ref 7.4–10.4)
POTASSIUM SERPL-SCNC: 3.2 MMOL/L (ref 3.5–5.1)
PROT SERPL-MCNC: 7.7 G/DL (ref 6.4–8.2)
PROTHROMBIN TIME: 13.9 SECONDS (ref 11.6–15)
RBC # BLD AUTO: 4.61 10X6/UL (ref 4–5.4)
RBC #/AREA URNS HPF: (no result) /HPF (ref 0–5)
SODIUM SERPL-SCNC: 141 MMOL/L (ref 136–145)
SP GR UR STRIP: 1.02 (ref 1–1.02)
SQUAMOUS #/AREA URNS HPF: (no result) /HPF (ref 0–5)
TROPONIN I SERPL-MCNC: < 0.017 NG/ML (ref 0–0.06)
UROBILINOGEN UR-MCNC: 1 MG/DL
WBC # BLD AUTO: 10.4 10X3/UL (ref 4.8–10.8)
WBC #/AREA URNS HPF: (no result) /HPF

## 2020-07-13 NOTE — NUR
PT STATES NAUSEA IS MUCH BETTER NOW AFTER IM PHENERGAN. DENIES ANY NEEDS AT
THIS TIME. CALL LIGHT IN REACH.

## 2020-07-13 NOTE — NUR
REPORT RECEIVED, WILL CONTINUE POC. PATIENT IS AAOX4, SITTING UP IN BED. NO
S/S OF DISTRESS OBSERVED. RR EVEN AND UNLABORED ON ROOM AIR. PATIENT WANTS TO
SHOWER. WILL WRAP IV SITE AND PROVIDE TOWELS AND TOILETRIES. PATIENT DENIES
FURTHER NEEDS AT THIS TIME. CL IN REACH, BED LOCKED AND LOWERED. WILL CTM.

## 2020-07-13 NOTE — NUR
RECEIVED PT TO ROOM 2112. PT A/O X4, RESP EVEN AND NONLABORED ON RA. PT
CONTINUES TO VOMIT, EVEN AFTER RECEIVING 4MG OF ZOFRAN IN THE ER. WILL PAGE
 TO SEE IF PT CAN HAVE SOMETHING ELSE FOR NAUSEA. PT WANTS TO GET IN THE
SHOWER WILL ASSESS PT AND PROVIDE HER WITH SUPPLIES NEEDED FOR SHOWER.

## 2020-07-14 VITALS — DIASTOLIC BLOOD PRESSURE: 72 MMHG | SYSTOLIC BLOOD PRESSURE: 104 MMHG

## 2020-07-14 VITALS — DIASTOLIC BLOOD PRESSURE: 71 MMHG | SYSTOLIC BLOOD PRESSURE: 128 MMHG

## 2020-07-14 VITALS — DIASTOLIC BLOOD PRESSURE: 69 MMHG | SYSTOLIC BLOOD PRESSURE: 111 MMHG

## 2020-07-14 VITALS — SYSTOLIC BLOOD PRESSURE: 102 MMHG | DIASTOLIC BLOOD PRESSURE: 53 MMHG

## 2020-07-14 VITALS — SYSTOLIC BLOOD PRESSURE: 98 MMHG | DIASTOLIC BLOOD PRESSURE: 66 MMHG

## 2020-07-14 VITALS — DIASTOLIC BLOOD PRESSURE: 65 MMHG | SYSTOLIC BLOOD PRESSURE: 106 MMHG

## 2020-07-14 LAB
ALBUMIN SERPL-MCNC: 3.3 G/DL (ref 3.4–5)
ALP SERPL-CCNC: 67 U/L (ref 30–120)
ALT SERPL-CCNC: 24 U/L (ref 10–68)
ANION GAP SERPL CALC-SCNC: 12.7 MMOL/L (ref 8–16)
BILIRUB SERPL-MCNC: 2.77 MG/DL (ref 0.2–1.3)
BUN SERPL-MCNC: 8 MG/DL (ref 7–18)
CALCIUM SERPL-MCNC: 7.8 MG/DL (ref 8.5–10.1)
CHLORIDE SERPL-SCNC: 104 MMOL/L (ref 98–107)
CO2 SERPL-SCNC: 22.5 MMOL/L (ref 21–32)
CREAT SERPL-MCNC: 0.5 MG/DL (ref 0.6–1.3)
ERYTHROCYTE [DISTWIDTH] IN BLOOD BY AUTOMATED COUNT: 12.7 % (ref 11.5–14.5)
GLOBULIN SER-MCNC: 2.2 G/L
GLUCOSE SERPL-MCNC: 91 MG/DL (ref 74–106)
HCT VFR BLD CALC: 32.6 % (ref 36–48)
HGB BLD-MCNC: 10.8 G/DL (ref 12–16)
LYMPHOCYTES NFR BLD AUTO: 22.8 % (ref 15–50)
MAGNESIUM SERPL-MCNC: 2 MG/DL (ref 1.8–2.4)
MCH RBC QN AUTO: 29.6 PG (ref 26–34)
MCHC RBC AUTO-ENTMCNC: 33.1 G/DL (ref 31–37)
MCV RBC: 89.3 FL (ref 80–100)
NEUTROPHILS NFR BLD AUTO: 68.4 % (ref 40–80)
OSMOLALITY SERPL CALC.SUM OF ELEC: 269 MOSM/KG (ref 275–300)
PLATELET # BLD: 175 10X3/UL (ref 130–400)
PMV BLD AUTO: 12.2 FL (ref 7.4–10.4)
POTASSIUM SERPL-SCNC: 3.2 MMOL/L (ref 3.5–5.1)
PROT SERPL-MCNC: 5.5 G/DL (ref 6.4–8.2)
RBC # BLD AUTO: 3.65 10X6/UL (ref 4–5.4)
SODIUM SERPL-SCNC: 136 MMOL/L (ref 136–145)
WBC # BLD AUTO: 8.2 10X3/UL (ref 4.8–10.8)

## 2020-07-14 NOTE — MORECARE
CASE MANAGEMENT DISCHARGE SUMMARY
 
 
PATIENT: JAS LAM                     UNIT: [id number]
ACCOUNT#: [id number]                       ADM DATE: 20
AGE: 40     : 84  SEX: F            ROOM/BED: D.2112    
AUTHOR: AMADO BAINS                             PHYSICIAN:                               
 
REFERRING PHYSICIAN: ALESIA VILLARREAL DO           
DATE OF SERVICE: 20
Discharge Plan
 
 
Patient Name: JAS LAM
[facility]
Encounter #: [id number]
Medical Record #: [id number]
: 1980
Planned Disposition: Home or Self Care
Anticipated Discharge Date: 7/15/20
 
Discharge Date: 
Expected LOS: 2
Initial Reviewer: [email or web address]
Initial Review Date: 2020
Generated: 20   5:26 pm 
  
 
 
External Providers
External Provider: [employer]
 
Next Contact Date: 
Service Request Date: 
Service Type: 
Resolution: 
 
Reviewer: 
Comments: 
 
 
 
 
Coverage Notice
 
Reviewer: [id number] Grace Blevins
 
Notice Issued Date-Time: 2020  14:44
Notice Type: Medicare Outpatient Observation Notice
 
 
Notice Delivered To: Patient
Relationship to Patient: Self
Representative Name: Felicia Lam
 
Delivery Method: HAND - Hand Delivered
Rosa Days:
Prior Verbal Notification: 
 
Recipient Understood Notice: Yes
Recipient Signature: Yes
Med Rec Note Co-signed by Attending:
 
Coverage Notice Comment:  DODD delivered/signed by patient.
Patient Name: JAS LAM
Encounter #: [id number]
Page [id number]
 
 
 
 
 
Electronically Signed by AMADO BAINS on 20 at 1627
 
 
 
 
 
 
**All edits/amendments must be made on the electronic document**
 
DICTATION DATE: 20     : JUSTYNA  20     
RPT#: [id number]                                DC DATE:        
                                               STATUS: ADM IN  
[facility]
[address] [place] [address]
***END OF REPORT***

## 2020-07-14 NOTE — NUR
IV RESARTED TO RIGHT HAND WITH 20 GAUGE CATH X 1 STICK BY BLAYNE JANSEN AND
FLUSHED WITH NS.  LINE IS PATENT.

## 2020-07-14 NOTE — NUR
REPORT RECEIVED, WILL CONTINUE POC. PATIENT IS AAOX4, SITTING UP IN BED. NO
S/S OF DISTRESS OBSERVED, RR EVEN AND UNLABORED ON ROOM AIR. PATIENT DENIES
NEEDS AT THIS TIME. CL IN REACH, BED LOCKED AND LOWERED. WILL CTM.

## 2020-07-15 VITALS — DIASTOLIC BLOOD PRESSURE: 79 MMHG | SYSTOLIC BLOOD PRESSURE: 122 MMHG

## 2020-07-15 LAB
ALBUMIN SERPL-MCNC: 3.4 G/DL (ref 3.4–5)
ALP SERPL-CCNC: 73 U/L (ref 30–120)
ALT SERPL-CCNC: 23 U/L (ref 10–68)
ANION GAP SERPL CALC-SCNC: 14.9 MMOL/L (ref 8–16)
BILIRUB SERPL-MCNC: 2.49 MG/DL (ref 0.2–1.3)
BUN SERPL-MCNC: 6 MG/DL (ref 7–18)
CALCIUM SERPL-MCNC: 8.1 MG/DL (ref 8.5–10.1)
CHLORIDE SERPL-SCNC: 105 MMOL/L (ref 98–107)
CO2 SERPL-SCNC: 22.1 MMOL/L (ref 21–32)
CREAT SERPL-MCNC: 0.7 MG/DL (ref 0.6–1.3)
ERYTHROCYTE [DISTWIDTH] IN BLOOD BY AUTOMATED COUNT: 12.6 % (ref 11.5–14.5)
GLOBULIN SER-MCNC: 2.5 G/L
GLUCOSE SERPL-MCNC: 91 MG/DL (ref 74–106)
HCT VFR BLD CALC: 34.8 % (ref 36–48)
HGB BLD-MCNC: 11.8 G/DL (ref 12–16)
LYMPHOCYTES NFR BLD AUTO: 23 % (ref 15–50)
MAGNESIUM SERPL-MCNC: 2 MG/DL (ref 1.8–2.4)
MCH RBC QN AUTO: 29.9 PG (ref 26–34)
MCHC RBC AUTO-ENTMCNC: 33.9 G/DL (ref 31–37)
MCV RBC: 88.1 FL (ref 80–100)
NEUTROPHILS NFR BLD AUTO: 66.5 % (ref 40–80)
OSMOLALITY SERPL CALC.SUM OF ELEC: 275 MOSM/KG (ref 275–300)
PLATELET # BLD: 164 10X3/UL (ref 130–400)
PMV BLD AUTO: 11.7 FL (ref 7.4–10.4)
POTASSIUM SERPL-SCNC: 3 MMOL/L (ref 3.5–5.1)
PROT SERPL-MCNC: 5.9 G/DL (ref 6.4–8.2)
RBC # BLD AUTO: 3.95 10X6/UL (ref 4–5.4)
SODIUM SERPL-SCNC: 139 MMOL/L (ref 136–145)
WBC # BLD AUTO: 7.1 10X3/UL (ref 4.8–10.8)

## 2020-07-15 NOTE — NUR
D/C INSTRUCTIONS REVIEWED WITH PT. VERBALIZED UNDERSTANDING. IV REMOVED WITH
CATHETER TIP INTACT. PT DENIES WHEELCHAIR USE. AMBULATED OUT WITH ALL
BELONGINGS.

## 2020-07-16 NOTE — MORECARE
CASE MANAGEMENT DISCHARGE SUMMARY
 
 
PATIENT: JAS LAM                     UNIT: [id number]
ACCOUNT#: [id number]                       ADM DATE: 20
AGE: 40     : 84  SEX: F            ROOM/BED: D.2112    
AUTHOR: AMADO BAINS                             PHYSICIAN:                               
 
REFERRING PHYSICIAN: ALESIA VILLARREAL DO           
DATE OF SERVICE: 20
Discharge Plan
 
 
Patient Name: JAS LAM
[facility]
Encounter #: [id number]
Medical Record #: [id number]
: 1980
Planned Disposition: Home or Self Care
Anticipated Discharge Date: 7/15/20
 
Discharge Date: 07/15/2020
Expected LOS: 2
Initial Reviewer: [email or web address]
Initial Review Date: 2020
Generated: 20   2:34 am 
  
 
 
 
 
 
 
Coverage Notice
 
Reviewer: [id number] Grace Blevins
 
Notice Issued Date-Time: 2020  14:44
Notice Type: Medicare Outpatient Observation Notice
 
Notice Delivered To: Patient
Relationship to Patient: Self
Representative Name: Felicia Lam
 
Delivery Method: HAND - Hand Delivered
Rosa Days:
Prior Verbal Notification: 
 
Recipient Understood Notice: Yes
Recipient Signature: Yes
 
Med Rec Note Co-signed by Attending:
 
Coverage Notice Comment:  DODD delivered/signed by patient.
 
Last DP export: 20   3:27 p
Patient Name: JAS LAM
Encounter #: [id number]
Page [id number]
 
 
 
 
 
Electronically Signed by AMADO BAINS on 20 at 0135
 
 
 
 
 
 
**All edits/amendments must be made on the electronic document**
 
DICTATION DATE: 20     : JUSTYNA  20     
RPT#: [id number]                                DC DATE:07/15/20
                                               STATUS: DIS IN  
[facility]
[address] [place] [address]
***END OF REPORT***

## 2020-09-29 ENCOUNTER — HOSPITAL ENCOUNTER (INPATIENT)
Dept: HOSPITAL 84 - D.ER | Age: 40
LOS: 3 days | Discharge: HOME | DRG: 394 | End: 2020-10-02
Attending: FAMILY MEDICINE | Admitting: FAMILY MEDICINE
Payer: MEDICARE

## 2020-09-29 VITALS — BODY MASS INDEX: 18.55 KG/M2 | BODY MASS INDEX: 18.55 KG/M2 | HEIGHT: 69 IN | WEIGHT: 125.26 LBS

## 2020-09-29 VITALS — DIASTOLIC BLOOD PRESSURE: 78 MMHG | SYSTOLIC BLOOD PRESSURE: 126 MMHG

## 2020-09-29 DIAGNOSIS — N39.0: ICD-10-CM

## 2020-09-29 DIAGNOSIS — E80.6: ICD-10-CM

## 2020-09-29 DIAGNOSIS — F12.20: ICD-10-CM

## 2020-09-29 DIAGNOSIS — R50.9: ICD-10-CM

## 2020-09-29 DIAGNOSIS — M79.7: ICD-10-CM

## 2020-09-29 DIAGNOSIS — R11.15: Primary | ICD-10-CM

## 2020-09-29 DIAGNOSIS — R10.9: ICD-10-CM

## 2020-09-29 LAB
ALBUMIN SERPL-MCNC: 4.8 G/DL (ref 3.4–5)
ALP SERPL-CCNC: 110 U/L (ref 30–120)
ALT SERPL-CCNC: 22 U/L (ref 10–68)
AMYLASE SERPL-CCNC: 184 U/L (ref 25–115)
ANION GAP SERPL CALC-SCNC: 17.3 MMOL/L (ref 8–16)
BACTERIA #/AREA URNS HPF: (no result) HPF
BASOPHILS NFR BLD AUTO: 0.1 % (ref 0–2)
BILIRUB SERPL-MCNC: 1.64 MG/DL (ref 0.2–1.3)
BILIRUB SERPL-MCNC: NEGATIVE MG/DL
BUN SERPL-MCNC: 14 MG/DL (ref 7–18)
CALCIUM SERPL-MCNC: 9.7 MG/DL (ref 8.5–10.1)
CHLORIDE SERPL-SCNC: 104 MMOL/L (ref 98–107)
CO2 SERPL-SCNC: 21.6 MMOL/L (ref 21–32)
CREAT SERPL-MCNC: 1.1 MG/DL (ref 0.6–1.3)
EOSINOPHIL NFR BLD: 0 % (ref 0–7)
ERYTHROCYTE [DISTWIDTH] IN BLOOD BY AUTOMATED COUNT: 12.8 % (ref 11.5–14.5)
GLOBULIN SER-MCNC: 3.8 G/L
GLUCOSE SERPL-MCNC: 151 MG/DL (ref 74–106)
HCG UR QL: NEGATIVE
HCT VFR BLD CALC: 43.6 % (ref 36–48)
HGB BLD-MCNC: 14.7 G/DL (ref 12–16)
IMM GRANULOCYTES NFR BLD: 0.5 % (ref 0–5)
KETONES UR STRIP-MCNC: (no result) MG/DL
LIPASE SERPL-CCNC: 49 U/L (ref 73–393)
LYMPHOCYTES NFR BLD AUTO: 1.6 % (ref 15–50)
MCH RBC QN AUTO: 29.5 PG (ref 26–34)
MCHC RBC AUTO-ENTMCNC: 33.7 G/DL (ref 31–37)
MCV RBC: 87.4 FL (ref 80–100)
MONOCYTES NFR BLD: 3.1 % (ref 2–11)
NEUTROPHILS NFR BLD AUTO: 94.7 % (ref 40–80)
NITRITE UR-MCNC: NEGATIVE MG/ML
OSMOLALITY SERPL CALC.SUM OF ELEC: 281 MOSM/KG (ref 275–300)
PH UR STRIP: 5 [PH] (ref 5–8)
PLATELET # BLD: 275 10X3/UL (ref 130–400)
PMV BLD AUTO: 12.3 FL (ref 7.4–10.4)
POTASSIUM SERPL-SCNC: 3.9 MMOL/L (ref 3.5–5.1)
PROT SERPL-MCNC: 8.6 G/DL (ref 6.4–8.2)
RBC # BLD AUTO: 4.99 10X6/UL (ref 4–5.4)
SODIUM SERPL-SCNC: 139 MMOL/L (ref 136–145)
SQUAMOUS #/AREA URNS HPF: (no result) /HPF (ref 0–5)
TROPONIN I SERPL-MCNC: < 0.017 NG/ML (ref 0–0.06)
UROBILINOGEN UR-MCNC: NORMAL MG/DL (ref ?–2)
WBC # BLD AUTO: 19.4 10X3/UL (ref 4.8–10.8)
WBC #/AREA URNS HPF: (no result) HPF (ref 0–4)

## 2020-09-30 VITALS — DIASTOLIC BLOOD PRESSURE: 79 MMHG | SYSTOLIC BLOOD PRESSURE: 138 MMHG

## 2020-09-30 VITALS — DIASTOLIC BLOOD PRESSURE: 74 MMHG | SYSTOLIC BLOOD PRESSURE: 114 MMHG

## 2020-09-30 VITALS — DIASTOLIC BLOOD PRESSURE: 72 MMHG | SYSTOLIC BLOOD PRESSURE: 115 MMHG

## 2020-09-30 LAB
ALBUMIN SERPL-MCNC: 4.2 G/DL (ref 3.4–5)
ALP SERPL-CCNC: 93 U/L (ref 30–120)
ALT SERPL-CCNC: 16 U/L (ref 10–68)
AMYLASE SERPL-CCNC: 137 U/L (ref 25–115)
ANION GAP SERPL CALC-SCNC: 17.8 MMOL/L (ref 8–16)
APTT BLD: 26.7 SECONDS (ref 22.8–39.4)
BACTERIA #/AREA URNS HPF: (no result) HPF
BASOPHILS NFR BLD AUTO: 0.1 % (ref 0–2)
BILIRUB SERPL-MCNC: 1.71 MG/DL (ref 0.2–1.3)
BILIRUB SERPL-MCNC: NEGATIVE MG/DL
BUN SERPL-MCNC: 11 MG/DL (ref 7–18)
CALCIUM SERPL-MCNC: 9.2 MG/DL (ref 8.5–10.1)
CHLORIDE SERPL-SCNC: 106 MMOL/L (ref 98–107)
CK MB SERPL-MCNC: 1.2 U/L (ref 0–3.6)
CO2 SERPL-SCNC: 21 MMOL/L (ref 21–32)
CREAT SERPL-MCNC: 0.8 MG/DL (ref 0.6–1.3)
CRP SERPL-MCNC: < 0.2 MG/DL (ref 0–0.9)
EOSINOPHIL NFR BLD: 0 % (ref 0–7)
ERYTHROCYTE [DISTWIDTH] IN BLOOD BY AUTOMATED COUNT: 12.9 % (ref 11.5–14.5)
ERYTHROCYTE [SEDIMENTATION RATE] IN BLOOD: 11 MM/HR (ref 0–20)
EST. AVERAGE GLUCOSE BLD GHB EST-MCNC: 105 MG/DL (ref 74–154)
GLOBULIN SER-MCNC: 3 G/L
GLUCOSE SERPL-MCNC: 125 MG/DL (ref 74–106)
HCT VFR BLD CALC: 38.3 % (ref 36–48)
HGB BLD-MCNC: 12.8 G/DL (ref 12–16)
IMM GRANULOCYTES NFR BLD: 0.2 % (ref 0–5)
INR PPP: 1.16 (ref 0.85–1.17)
KETONES UR STRIP-MCNC: (no result) MG/DL
LIPASE SERPL-CCNC: 41 U/L (ref 73–393)
LYMPHOCYTES NFR BLD AUTO: 6.8 % (ref 15–50)
MAGNESIUM SERPL-MCNC: 2.1 MG/DL (ref 1.8–2.4)
MCH RBC QN AUTO: 29.3 PG (ref 26–34)
MCHC RBC AUTO-ENTMCNC: 33.4 G/DL (ref 31–37)
MCV RBC: 87.6 FL (ref 80–100)
MONOCYTES NFR BLD: 9.7 % (ref 2–11)
NEUTROPHILS NFR BLD AUTO: 83.2 % (ref 40–80)
NITRITE UR-MCNC: NEGATIVE MG/ML
OSMOLALITY SERPL CALC.SUM OF ELEC: 280 MOSM/KG (ref 275–300)
PH UR STRIP: 5 [PH] (ref 5–8)
PHOSPHATE SERPL-MCNC: 3.2 MG/DL (ref 2.5–4.9)
PLATELET # BLD: 236 10X3/UL (ref 130–400)
PMV BLD AUTO: 12.1 FL (ref 7.4–10.4)
POTASSIUM SERPL-SCNC: 3.8 MMOL/L (ref 3.5–5.1)
PROT SERPL-MCNC: 7.2 G/DL (ref 6.4–8.2)
PROTHROMBIN TIME: 14.8 SECONDS (ref 11.6–15)
RBC # BLD AUTO: 4.37 10X6/UL (ref 4–5.4)
SODIUM SERPL-SCNC: 141 MMOL/L (ref 136–145)
SQUAMOUS #/AREA URNS HPF: (no result) /HPF (ref 0–5)
TROPONIN I SERPL-MCNC: < 0.017 NG/ML (ref 0–0.06)
TSH SERPL-ACNC: 1.82 UIU/ML (ref 0.36–3.74)
UROBILINOGEN UR-MCNC: NORMAL MG/DL (ref ?–2)
WBC # BLD AUTO: 12.4 10X3/UL (ref 4.8–10.8)
WBC #/AREA URNS HPF: (no result) HPF (ref 0–4)

## 2020-09-30 NOTE — NUR
C/O N/V.  She has zofran and phenergan for nausea. She is NPO until further
notice.  Surgery and GI have been consulted.
20g in left wrist with NS @ 125.  Patent no redness, edema or tenderness.
Continue to montior.

## 2020-09-30 NOTE — NUR
PT WANTED TO TAKE A SHOWER AS SOON AS SHE ARRIVED TO THE ROOM. WE OBTAINED HER
VS WHICH ARE STABLE BEFORE SHE GOT IN THE SHOWER.

## 2020-09-30 NOTE — NUR
RECEIVED PT FROM ED VIA WC. NO ACUTE DISTRESS NOTED. NAUSEATED AND VOMITING.
A&OX4. BED IN LOWEST POSITION, LOCKED, SIDE RAILS UP X2. CALL LIGHT WITHIN
REACH. NEEDS ANTICIPATED AND MET. WILL CONTINUR TO MONITOR

## 2020-10-01 VITALS — SYSTOLIC BLOOD PRESSURE: 144 MMHG | DIASTOLIC BLOOD PRESSURE: 80 MMHG

## 2020-10-01 VITALS — DIASTOLIC BLOOD PRESSURE: 83 MMHG | SYSTOLIC BLOOD PRESSURE: 138 MMHG

## 2020-10-01 VITALS — SYSTOLIC BLOOD PRESSURE: 118 MMHG | DIASTOLIC BLOOD PRESSURE: 81 MMHG

## 2020-10-01 VITALS — SYSTOLIC BLOOD PRESSURE: 100 MMHG | DIASTOLIC BLOOD PRESSURE: 63 MMHG

## 2020-10-01 VITALS — DIASTOLIC BLOOD PRESSURE: 69 MMHG | SYSTOLIC BLOOD PRESSURE: 122 MMHG

## 2020-10-01 LAB
ANION GAP SERPL CALC-SCNC: 16 MMOL/L (ref 8–16)
BASOPHILS NFR BLD AUTO: 0.1 % (ref 0–2)
BUN SERPL-MCNC: 17 MG/DL (ref 7–18)
CALCIUM SERPL-MCNC: 8.5 MG/DL (ref 8.5–10.1)
CHLORIDE SERPL-SCNC: 103 MMOL/L (ref 98–107)
CO2 SERPL-SCNC: 21.2 MMOL/L (ref 21–32)
CREAT SERPL-MCNC: 0.8 MG/DL (ref 0.6–1.3)
EOSINOPHIL NFR BLD: 0 % (ref 0–7)
ERYTHROCYTE [DISTWIDTH] IN BLOOD BY AUTOMATED COUNT: 12.8 % (ref 11.5–14.5)
GLUCOSE SERPL-MCNC: 129 MG/DL (ref 74–106)
HCT VFR BLD CALC: 36.2 % (ref 36–48)
HGB BLD-MCNC: 12 G/DL (ref 12–16)
IMM GRANULOCYTES NFR BLD: 0.2 % (ref 0–5)
LYMPHOCYTES NFR BLD AUTO: 7.7 % (ref 15–50)
MAGNESIUM SERPL-MCNC: 1.9 MG/DL (ref 1.8–2.4)
MCH RBC QN AUTO: 28.8 PG (ref 26–34)
MCHC RBC AUTO-ENTMCNC: 33.1 G/DL (ref 31–37)
MCV RBC: 87 FL (ref 80–100)
MONOCYTES NFR BLD: 6.8 % (ref 2–11)
NEUTROPHILS NFR BLD AUTO: 85.2 % (ref 40–80)
OSMOLALITY SERPL CALC.SUM OF ELEC: 277 MOSM/KG (ref 275–300)
PHOSPHATE SERPL-MCNC: 2.5 MG/DL (ref 2.5–4.9)
PLATELET # BLD: 202 10X3/UL (ref 130–400)
PMV BLD AUTO: 11.8 FL (ref 7.4–10.4)
POTASSIUM SERPL-SCNC: 3.2 MMOL/L (ref 3.5–5.1)
RBC # BLD AUTO: 4.16 10X6/UL (ref 4–5.4)
SODIUM SERPL-SCNC: 137 MMOL/L (ref 136–145)
WBC # BLD AUTO: 11.3 10X3/UL (ref 4.8–10.8)

## 2020-10-01 NOTE — NUR
LEFT HAND SWELLING AROUND IV INSERTION SITE. PT REPORTS MILD DISCOMFORT. DCd
WITH CATH INTACT. WARM PACK APPLIED FOR PAIN/SWELLING. 22G IV SITED TO RIGHT
FOREARM, 1ST ATTEMPT. PT TOLERATED WELL. PT REPORTS IV COMPAZINE IS RELIEVING
NAUSEA. PAIN IS SETTLING SOME, 6/10. ICE CHIPS PER REQUEST. DENIES
FURTHER NEEDS AT TIMES TIME, CTM.

## 2020-10-01 NOTE — NUR
A&O RESTING IN BED WITH EYES OPEN. NO C/O PAIN. NO S/S OF ACUTE DISTRESS
NOTED. CALL LIGHT IN REACH. WILL CONTINUE TO MONITOR.

## 2020-10-01 NOTE — NUR
A&O RESTING IN BED WITH EYES OPEN. NO C/O PAIN. NO S/S OF ACUTE DISTRESS
NOTED. NPO EXCEPT ICE CHIPS. IV TO RIGHT FOREARM, 1/2 NS INFUSING @ 50ML/HR.
SITE PATENT WITHOUT REDNESS OR SWELLING. LEFT HAND SWELLING D/T IV
INFILTRATION. POTASSIUM 3.2 THIS AM, WILL FOLLOW ELECTROLYTE PROTOCOL. DENIES
ANY NEEDS AT THIS TIME. CALL LIGHT IN REACH. WILL CONTINUE TO MONITOR.

## 2020-10-02 VITALS — DIASTOLIC BLOOD PRESSURE: 75 MMHG | SYSTOLIC BLOOD PRESSURE: 113 MMHG

## 2020-10-02 VITALS — DIASTOLIC BLOOD PRESSURE: 85 MMHG | SYSTOLIC BLOOD PRESSURE: 122 MMHG

## 2020-10-02 VITALS — DIASTOLIC BLOOD PRESSURE: 86 MMHG | SYSTOLIC BLOOD PRESSURE: 132 MMHG

## 2020-10-02 VITALS — DIASTOLIC BLOOD PRESSURE: 93 MMHG | SYSTOLIC BLOOD PRESSURE: 140 MMHG

## 2020-10-02 LAB
ANION GAP SERPL CALC-SCNC: 16.2 MMOL/L (ref 8–16)
BASOPHILS NFR BLD AUTO: 0.1 % (ref 0–2)
BUN SERPL-MCNC: 11 MG/DL (ref 7–18)
CALCIUM SERPL-MCNC: 8.7 MG/DL (ref 8.5–10.1)
CHLORIDE SERPL-SCNC: 100 MMOL/L (ref 98–107)
CO2 SERPL-SCNC: 22.7 MMOL/L (ref 21–32)
CREAT SERPL-MCNC: 0.7 MG/DL (ref 0.6–1.3)
EOSINOPHIL NFR BLD: 0.1 % (ref 0–7)
ERYTHROCYTE [DISTWIDTH] IN BLOOD BY AUTOMATED COUNT: 12.6 % (ref 11.5–14.5)
GLUCOSE SERPL-MCNC: 116 MG/DL (ref 74–106)
HCT VFR BLD CALC: 36.9 % (ref 36–48)
HGB BLD-MCNC: 12.5 G/DL (ref 12–16)
IMM GRANULOCYTES NFR BLD: 0.2 % (ref 0–5)
LYMPHOCYTES NFR BLD AUTO: 10.2 % (ref 15–50)
MAGNESIUM SERPL-MCNC: 1.9 MG/DL (ref 1.8–2.4)
MCH RBC QN AUTO: 29.6 PG (ref 26–34)
MCHC RBC AUTO-ENTMCNC: 33.9 G/DL (ref 31–37)
MCV RBC: 87.4 FL (ref 80–100)
MONOCYTES NFR BLD: 7.6 % (ref 2–11)
NEUTROPHILS NFR BLD AUTO: 81.8 % (ref 40–80)
OSMOLALITY SERPL CALC.SUM OF ELEC: 271 MOSM/KG (ref 275–300)
PHOSPHATE SERPL-MCNC: 2.6 MG/DL (ref 2.5–4.9)
PLATELET # BLD: 201 10X3/UL (ref 130–400)
PMV BLD AUTO: 11.5 FL (ref 7.4–10.4)
POTASSIUM SERPL-SCNC: 2.9 MMOL/L (ref 3.5–5.1)
RBC # BLD AUTO: 4.22 10X6/UL (ref 4–5.4)
SODIUM SERPL-SCNC: 136 MMOL/L (ref 136–145)
WBC # BLD AUTO: 9.2 10X3/UL (ref 4.8–10.8)

## 2020-10-02 NOTE — MORECARE
CASE MANAGEMENT DISCHARGE SUMMARY
 
 
PATIENT: JAS MEJÍA                     UNIT: [id number]
ACCOUNT#: [id number]                       ADM DATE: 20
AGE: 40     : 84  SEX: F            ROOM/BED: D.2240    
AUTHOR: HERSON,DOC                             PHYSICIAN:                               
 
REFERRING PHYSICIAN: RADHA DONAHUE MD           
DATE OF SERVICE: 10/02/20
Discharge Plan
 
 
Patient Name: JAS MEJÍA
Facility: [facility]:[place]
Encounter #: [id number]
Medical Record #: [id number]
: 1980
Planned Disposition: 
Anticipated Discharge Date: 
 
Discharge Date: 
Expected LOS: 
Initial Reviewer: [id number]
Initial Review Date: 2020
Generated: 10/2/20   1:07 pm 
Comments
 
DCP- Discharge Planning
 
Updated by [id number]: Marian Wiseman on 10/2/20  11:03 am CT
Patient Name: JAS MEJÍA                                     
Admission Status: ER   
Accout number: [id number]                              
Admission Date: 2020   
: 1980                                                        
Admission Diagnosis:NAUSEA WITH VOMITING, UNSPECIFIED   
Attending: RADHA DONAHUE                                                
Current LOS:  3   
  
Anticipated DC Date:    
Planned Disposition:    
Primary Insurance: [employer]IC[employer] ADV   
  
  
Discharge Planning Comments: CM met with patient at bedside after explaining 
CM role and obtaining verbal consent. CM discussed availability / needs of 
home health, REHAB and medical equipment. DENIES ANY DC NEEDS. IMM EXPLAINED, 
PATIENT VERBALIZED UNDERSTANDING AND SIGNED FORM, GIVEN TO PATIENT AND PLACED 
COPY ON THE CHART. CM TO FOLLOW AND ASSIST AS NEEDED. ANTICIPATES DC TO HOME 
 
TODAY.   
  
  
  
  
  
  
: Marian Wiseman
 DCPIA - Discharge Planning Initial Assessment
 
Updated by [id number]: Marian Wiseman on 10/2/20  12:01 pm
*  Is the patient Alert and Oriented?
Yes
*  PCP
[facility]
*  Pharmacy
[facility]
*  Preadmission Environment
Home with Family
*  ADLs
Independent
*  Equipment
None
*  Community resources currently utilized
None
*  Additional services required to return to the preadmission environment?
No
*  Can the patient safely return to the preadmission environment?
Yes
*  Has this patient been hospitalized within the prior 30 days at any 
hospital?
No
 
 
 
 
 
Coverage Notice
 
Reviewer: [id number] - Marian Wiseman
 
Notice Issued Date-Time: 10/02/2020  12:03
Notice Type: IM Discharge Notice
 
Notice Delivered To: Patient
Relationship to Patient: 
Representative Name: 
 
Delivery Method: HAND - Hand Delivered
Rosa Days:
Prior Verbal Notification: 
 
 
Recipient Understood Notice: Yes
Recipient Signature: Yes
Med Rec Note Co-signed by Attending:
 
Coverage Notice Comment:  
Patient Name: JAS MEJÍA
Encounter #: [id number]
Page [id number]
 
 
 
 
 
Electronically Signed by AMADO BAINS on 10/02/20 at 1208
 
 
 
 
 
 
**All edits/amendments must be made on the electronic document**
 
DICTATION DATE: 10/02/20 1207     : JUSTYNA  10/02/20 1207     
RPT#: [id number]                                DC DATE:        
                                               STATUS: ADM IN  
[facility]
[address] [place] [address]
***END OF REPORT***

## 2020-10-02 NOTE — NUR
DISCHARGED PATIENT HOME WITH FAMILY VIA WHEELCHAIR. DISCONTINUED IV, CATHETER
TIP INTACT. WENT OVER DISCHARGE INSTRUCTIONS WITH PATIENT, VERBALIZED
UNDERSTANDING. DENIES ANYTHING FURTHER.

## 2020-10-03 ENCOUNTER — HOSPITAL ENCOUNTER (EMERGENCY)
Dept: HOSPITAL 84 - D.ER | Age: 40
Discharge: HOME | End: 2020-10-03
Payer: MEDICARE

## 2020-10-03 VITALS — HEIGHT: 69 IN

## 2020-10-03 VITALS — SYSTOLIC BLOOD PRESSURE: 133 MMHG | DIASTOLIC BLOOD PRESSURE: 77 MMHG

## 2020-10-03 DIAGNOSIS — Z53.29: ICD-10-CM

## 2020-10-03 DIAGNOSIS — N12: Primary | ICD-10-CM

## 2020-10-03 DIAGNOSIS — K31.84: ICD-10-CM

## 2020-10-03 DIAGNOSIS — R11.2: ICD-10-CM

## 2020-10-03 LAB
ALBUMIN SERPL-MCNC: 5 G/DL (ref 3.4–5)
ALP SERPL-CCNC: 97 U/L (ref 30–120)
ALT SERPL-CCNC: 14 U/L (ref 10–68)
AMYLASE SERPL-CCNC: 284 U/L (ref 25–115)
ANION GAP SERPL CALC-SCNC: 17.3 MMOL/L (ref 8–16)
BASOPHILS NFR BLD AUTO: 0.1 % (ref 0–2)
BILIRUB SERPL-MCNC: 1.34 MG/DL (ref 0.2–1.3)
BUN SERPL-MCNC: 17 MG/DL (ref 7–18)
CALCIUM SERPL-MCNC: 9.4 MG/DL (ref 8.5–10.1)
CHLORIDE SERPL-SCNC: 99 MMOL/L (ref 98–107)
CO2 SERPL-SCNC: 21.9 MMOL/L (ref 21–32)
CREAT SERPL-MCNC: 0.8 MG/DL (ref 0.6–1.3)
EOSINOPHIL NFR BLD: 0 % (ref 0–7)
ERYTHROCYTE [DISTWIDTH] IN BLOOD BY AUTOMATED COUNT: 12.5 % (ref 11.5–14.5)
GLOBULIN SER-MCNC: 3.6 G/L
GLUCOSE SERPL-MCNC: 142 MG/DL (ref 74–106)
HCT VFR BLD CALC: 44.6 % (ref 36–48)
HGB BLD-MCNC: 15.1 G/DL (ref 12–16)
IMM GRANULOCYTES NFR BLD: 0.4 % (ref 0–5)
LIPASE SERPL-CCNC: 77 U/L (ref 73–393)
LYMPHOCYTES NFR BLD AUTO: 9.5 % (ref 15–50)
MCH RBC QN AUTO: 29 PG (ref 26–34)
MCHC RBC AUTO-ENTMCNC: 33.9 G/DL (ref 31–37)
MCV RBC: 85.8 FL (ref 80–100)
MONOCYTES NFR BLD: 1.7 % (ref 2–11)
NEUTROPHILS NFR BLD AUTO: 88.3 % (ref 40–80)
OSMOLALITY SERPL CALC.SUM OF ELEC: 273 MOSM/KG (ref 275–300)
PLATELET # BLD: 261 10X3/UL (ref 130–400)
PMV BLD AUTO: 11.5 FL (ref 7.4–10.4)
POTASSIUM SERPL-SCNC: 3.2 MMOL/L (ref 3.5–5.1)
PROT SERPL-MCNC: 8.6 G/DL (ref 6.4–8.2)
RBC # BLD AUTO: 5.2 10X6/UL (ref 4–5.4)
SODIUM SERPL-SCNC: 135 MMOL/L (ref 136–145)
TROPONIN I SERPL-MCNC: 0.03 NG/ML (ref 0–0.06)
WBC # BLD AUTO: 13.1 10X3/UL (ref 4.8–10.8)

## 2020-10-04 ENCOUNTER — HOSPITAL ENCOUNTER (INPATIENT)
Dept: HOSPITAL 84 - D.ER | Age: 40
Discharge: HOME | DRG: 392 | End: 2020-10-04
Attending: FAMILY MEDICINE | Admitting: FAMILY MEDICINE
Payer: MEDICARE

## 2020-10-04 VITALS — SYSTOLIC BLOOD PRESSURE: 133 MMHG | DIASTOLIC BLOOD PRESSURE: 85 MMHG

## 2020-10-04 VITALS — DIASTOLIC BLOOD PRESSURE: 78 MMHG | SYSTOLIC BLOOD PRESSURE: 126 MMHG

## 2020-10-04 VITALS — SYSTOLIC BLOOD PRESSURE: 131 MMHG | DIASTOLIC BLOOD PRESSURE: 76 MMHG

## 2020-10-04 VITALS — WEIGHT: 125.26 LBS | HEIGHT: 69 IN | BODY MASS INDEX: 18.55 KG/M2

## 2020-10-04 DIAGNOSIS — R11.2: ICD-10-CM

## 2020-10-04 DIAGNOSIS — M79.7: ICD-10-CM

## 2020-10-04 DIAGNOSIS — K31.84: Primary | ICD-10-CM

## 2020-10-04 DIAGNOSIS — N39.0: ICD-10-CM

## 2020-10-04 DIAGNOSIS — F12.90: ICD-10-CM

## 2020-10-04 DIAGNOSIS — R11.15: ICD-10-CM

## 2020-10-04 DIAGNOSIS — E86.0: ICD-10-CM

## 2020-10-04 LAB
ALBUMIN SERPL-MCNC: 4.9 G/DL (ref 3.4–5)
ALP SERPL-CCNC: 95 U/L (ref 30–120)
ALT SERPL-CCNC: 18 U/L (ref 10–68)
AMYLASE SERPL-CCNC: 413 U/L (ref 25–115)
ANION GAP SERPL CALC-SCNC: 13.7 MMOL/L (ref 8–16)
BACTERIA #/AREA URNS HPF: (no result) HPF
BASOPHILS NFR BLD AUTO: 0.2 % (ref 0–2)
BILIRUB SERPL-MCNC: 1.48 MG/DL (ref 0.2–1.3)
BILIRUB SERPL-MCNC: NEGATIVE MG/DL
BUN SERPL-MCNC: 14 MG/DL (ref 7–18)
CALCIUM SERPL-MCNC: 9.1 MG/DL (ref 8.5–10.1)
CHLORIDE SERPL-SCNC: 101 MMOL/L (ref 98–107)
CO2 SERPL-SCNC: 24.3 MMOL/L (ref 21–32)
CREAT SERPL-MCNC: 0.9 MG/DL (ref 0.6–1.3)
EOSINOPHIL NFR BLD: 0 % (ref 0–7)
ERYTHROCYTE [DISTWIDTH] IN BLOOD BY AUTOMATED COUNT: 12.6 % (ref 11.5–14.5)
GLOBULIN SER-MCNC: 3.4 G/L
GLUCOSE SERPL-MCNC: 124 MG/DL (ref 74–106)
HCT VFR BLD CALC: 44.3 % (ref 36–48)
HGB BLD-MCNC: 15.1 G/DL (ref 12–16)
IMM GRANULOCYTES NFR BLD: 0.4 % (ref 0–5)
KETONES UR STRIP-MCNC: (no result) MG/DL
LIPASE SERPL-CCNC: 60 U/L (ref 73–393)
LYMPHOCYTES NFR BLD AUTO: 7 % (ref 15–50)
MCH RBC QN AUTO: 29.6 PG (ref 26–34)
MCHC RBC AUTO-ENTMCNC: 34.1 G/DL (ref 31–37)
MCV RBC: 86.9 FL (ref 80–100)
MONOCYTES NFR BLD: 5.4 % (ref 2–11)
NEUTROPHILS NFR BLD AUTO: 87 % (ref 40–80)
NITRITE UR-MCNC: NEGATIVE MG/ML
OSMOLALITY SERPL CALC.SUM OF ELEC: 273 MOSM/KG (ref 275–300)
PH UR STRIP: 6 [PH] (ref 5–8)
PLATELET # BLD: 247 10X3/UL (ref 130–400)
PMV BLD AUTO: 11.4 FL (ref 7.4–10.4)
POTASSIUM SERPL-SCNC: 3 MMOL/L (ref 3.5–5.1)
PROT SERPL-MCNC: 8.3 G/DL (ref 6.4–8.2)
RBC # BLD AUTO: 5.1 10X6/UL (ref 4–5.4)
SODIUM SERPL-SCNC: 136 MMOL/L (ref 136–145)
SQUAMOUS #/AREA URNS HPF: (no result) /HPF (ref 0–5)
UROBILINOGEN UR-MCNC: NORMAL MG/DL (ref ?–2)
WBC # BLD AUTO: 13.3 10X3/UL (ref 4.8–10.8)
WBC #/AREA URNS HPF: (no result) HPF (ref 0–4)

## 2020-10-04 NOTE — NUR
RALPH KIRKLAND HHELD DUE TO PATIENT TO BE DISCHARGED, K TABS NOT GIVEN DUE TO
PATIENT UNABLE TO TOLERTE.  PHYSICIAN NOTIFIED.  WILL INFUSE 2 KCL RIDERS PRIOR
TO DISCHARGE

## 2020-10-04 NOTE — NUR
2nd potassium rider started and after partial dose, IV infiltrated.  Patient
refuses to have another IV started.  Requesting discharge.

## 2020-10-05 NOTE — MORECARE
CASE MANAGEMENT DISCHARGE SUMMARY
 
 
PATIENT: JAS MEJÍA                     UNIT: [id number]
ACCOUNT#: [id number]                       ADM DATE: 20
AGE: 40     : [birth year]  SEX: F            ROOM/BED: D.2240    
AUTHOR: HERSONDOC                             PHYSICIAN:                               
 
REFERRING PHYSICIAN: RADHA DONAHUE MD           
DATE OF SERVICE: 10/05/20
Discharge Plan
 
 
Patient Name: JAS MEJÍA
Facility: [facility]
Encounter #: [id number]
Medical Record #: [id number]
: 1980
Planned Disposition: 
Anticipated Discharge Date: 
 
Discharge Date: 10/02/2020
Expected LOS: 
Initial Reviewer: [email or web address]
Initial Review Date: 2020
Generated: 10/5/20  10:26 am 
Comments
 
DCP- Discharge Planning
 
Updated by [email or web address]: Marian Wiseman on 10/2/20  11:03 am CT
Patient Name: JAS MEJÍA                                     
Admission Status: ER   
Accout number: [id number]                              
Admission Date: 2020   
: 1980                                                        
Admission Diagnosis:NAUSEA WITH VOMITING, UNSPECIFIED   
Attending: RADHA DONAHUE                                                
Current LOS:  3   
  
Anticipated DC Date:    
Planned Disposition:    
Primary Insurance: [employer]   
  
  
Discharge Planning Comments: CM met with patient at bedside after explaining 
CM role and obtaining verbal consent. CM discussed availability / needs of 
home health, REHAB and medical equipment. DENIES ANY DC NEEDS. IMM EXPLAINED, 
PATIENT VERBALIZED UNDERSTANDING AND SIGNED FORM, GIVEN TO PATIENT AND PLACED 
COPY ON THE CHART. CM TO FOLLOW AND ASSIST AS NEEDED. ANTICIPATES DC TO HOME 
 
TODAY.   
  
  
  
  
  
  
: Marian Wiseman
 DCPIA - Discharge Planning Initial Assessment
 
Updated by [email or web address]: Marian Wiseman on 10/2/20  12:01 pm
*  Is the patient Alert and Oriented?
Yes
*  PCP
WAS AZEB
*  Pharmacy
[facility]
*  Preadmission Environment
Home with Family
*  ADLs
Independent
*  Equipment
None
*  Community resources currently utilized
None
*  Additional services required to return to the preadmission environment?
No
*  Can the patient safely return to the preadmission environment?
Yes
*  Has this patient been hospitalized within the prior 30 days at any 
hospital?
No
 
 
 
 
 
Coverage Notice
 
Reviewer: [email or web address] - Marian Wiseman
 
Notice Issued Date-Time: 10/02/2020  12:03
Notice Type: IM Discharge Notice
 
Notice Delivered To: Patient
Relationship to Patient: 
Representative Name: 
 
Delivery Method: HAND - Hand Delivered
Rosa Days:
Prior Verbal Notification: 
 
 
Recipient Understood Notice: Yes
Recipient Signature: Yes
Med Rec Note Co-signed by Attending:
 
Coverage Notice Comment:  
 
Last DP export: 10/2/20  11:08 a
Patient Name: JAS MEJÍA
Encounter #: [id number]
Page [id number]
 
 
 
 
 
Electronically Signed by AMADO BAINS on 10/05/20 at 0927
 
 
 
 
 
 
**All edits/amendments must be made on the electronic document**
 
DICTATION DATE: 10/05/20 0926     : JUSTYNA  10/05/20 0926     
RPT#: [id number]                                DC DATE:10/02/20
                                               STATUS: DIS IN  
[facility]
[address] [place] [address]
***END OF REPORT***

## 2021-03-18 ENCOUNTER — HOSPITAL ENCOUNTER (EMERGENCY)
Dept: HOSPITAL 84 - D.ER | Age: 41
Discharge: HOME | End: 2021-03-18
Payer: MEDICARE

## 2021-03-18 VITALS
WEIGHT: 120.25 LBS | BODY MASS INDEX: 17.81 KG/M2 | HEIGHT: 69 IN | HEIGHT: 69 IN | BODY MASS INDEX: 17.81 KG/M2 | WEIGHT: 120.25 LBS

## 2021-03-18 VITALS — DIASTOLIC BLOOD PRESSURE: 79 MMHG | SYSTOLIC BLOOD PRESSURE: 117 MMHG

## 2021-03-18 DIAGNOSIS — R11.2: Primary | ICD-10-CM

## 2021-03-18 LAB
ALBUMIN SERPL-MCNC: 4.1 G/DL (ref 3.4–5)
ALP SERPL-CCNC: 105 U/L (ref 30–120)
ALT SERPL-CCNC: 25 U/L (ref 10–68)
AMYLASE SERPL-CCNC: 102 U/L (ref 25–115)
ANION GAP SERPL CALC-SCNC: 16.8 MMOL/L (ref 8–16)
BACTERIA #/AREA URNS HPF: (no result) HPF
BASOPHILS NFR BLD AUTO: 0.1 % (ref 0–2)
BILIRUB SERPL-MCNC: 1.21 MG/DL (ref 0.2–1.3)
BILIRUB SERPL-MCNC: NEGATIVE MG/DL
BUN SERPL-MCNC: 10 MG/DL (ref 7–18)
CALCIUM SERPL-MCNC: 8.8 MG/DL (ref 8.5–10.1)
CHLORIDE SERPL-SCNC: 106 MMOL/L (ref 98–107)
CO2 SERPL-SCNC: 22.5 MMOL/L (ref 21–32)
CREAT SERPL-MCNC: 0.9 MG/DL (ref 0.6–1.3)
EOSINOPHIL NFR BLD: 0 % (ref 0–7)
ERYTHROCYTE [DISTWIDTH] IN BLOOD BY AUTOMATED COUNT: 13 % (ref 11.5–14.5)
GLOBULIN SER-MCNC: 3.4 G/L
GLUCOSE SERPL-MCNC: 160 MG/DL (ref 74–106)
HCT VFR BLD CALC: 39.5 % (ref 36–48)
HGB BLD-MCNC: 13.2 G/DL (ref 12–16)
IMM GRANULOCYTES NFR BLD: 0.1 % (ref 0–5)
KETONES UR STRIP-MCNC: NEGATIVE MG/DL
LIPASE SERPL-CCNC: 67 U/L (ref 73–393)
LYMPHOCYTES # BLD: 0.37 10X3/UL (ref 1.18–3.74)
LYMPHOCYTES NFR BLD AUTO: 2.5 % (ref 15–50)
MCH RBC QN AUTO: 29.5 PG (ref 26–34)
MCHC RBC AUTO-ENTMCNC: 33.4 G/DL (ref 31–37)
MCV RBC: 88.2 FL (ref 80–100)
MONOCYTES NFR BLD: 2.2 % (ref 2–11)
NEUTROPHILS # BLD: 13.94 10X3/UL (ref 1.56–6.13)
NEUTROPHILS NFR BLD AUTO: 95.1 % (ref 40–80)
NITRITE UR-MCNC: NEGATIVE MG/ML
OSMOLALITY SERPL CALC.SUM OF ELEC: 282 MOSM/KG (ref 275–300)
PH UR STRIP: 5 [PH] (ref 5–8)
PLATELET # BLD: 229 10X3/UL (ref 130–400)
PMV BLD AUTO: 11.7 FL (ref 7.4–10.4)
POTASSIUM SERPL-SCNC: 4.3 MMOL/L (ref 3.5–5.1)
PROT SERPL-MCNC: 7.5 G/DL (ref 6.4–8.2)
RBC # BLD AUTO: 4.48 10X6/UL (ref 4–5.4)
SODIUM SERPL-SCNC: 141 MMOL/L (ref 136–145)
SQUAMOUS #/AREA URNS HPF: (no result) HPF (ref 0–4)
TROPONIN I SERPL-MCNC: < 0.017 NG/ML (ref 0–0.06)
UROBILINOGEN UR-MCNC: NORMAL MG/DL (ref ?–2)
WBC # BLD AUTO: 14.7 10X3/UL (ref 4.8–10.8)

## 2021-03-19 ENCOUNTER — HOSPITAL ENCOUNTER (EMERGENCY)
Dept: HOSPITAL 84 - D.ER | Age: 41
Discharge: HOME | End: 2021-03-19
Payer: MEDICARE

## 2021-03-19 VITALS — SYSTOLIC BLOOD PRESSURE: 113 MMHG | DIASTOLIC BLOOD PRESSURE: 72 MMHG

## 2021-03-19 VITALS — HEIGHT: 69 IN | WEIGHT: 140.29 LBS | BODY MASS INDEX: 20.78 KG/M2

## 2021-03-19 DIAGNOSIS — R11.2: ICD-10-CM

## 2021-03-19 DIAGNOSIS — E87.6: ICD-10-CM

## 2021-03-19 DIAGNOSIS — K31.84: Primary | ICD-10-CM

## 2021-03-19 LAB
ALBUMIN SERPL-MCNC: 4.3 G/DL (ref 3.4–5)
ALP SERPL-CCNC: 96 U/L (ref 30–120)
ALT SERPL-CCNC: 21 U/L (ref 10–68)
AMPHETAMINES UR QL SCN: NEGATIVE QUAL
AMYLASE SERPL-CCNC: 93 U/L (ref 25–115)
ANION GAP SERPL CALC-SCNC: 15.1 MMOL/L (ref 8–16)
BACTERIA #/AREA URNS HPF: (no result) HPF
BARBITURATES UR QL SCN: NEGATIVE QUAL
BASOPHILS NFR BLD AUTO: 0 % (ref 0–2)
BENZODIAZ UR QL SCN: NEGATIVE QUAL
BILIRUB SERPL-MCNC: 2.1 MG/DL (ref 0.2–1.3)
BILIRUB SERPL-MCNC: NEGATIVE MG/DL
BUN SERPL-MCNC: 13 MG/DL (ref 7–18)
BZE UR QL SCN: NEGATIVE QUAL
CALCIUM SERPL-MCNC: 9.3 MG/DL (ref 8.5–10.1)
CANNABINOIDS UR QL SCN: POSITIVE QUAL
CHLORIDE SERPL-SCNC: 105 MMOL/L (ref 98–107)
CO2 SERPL-SCNC: 23.1 MMOL/L (ref 21–32)
CREAT SERPL-MCNC: 0.8 MG/DL (ref 0.6–1.3)
EOSINOPHIL NFR BLD: 0 % (ref 0–7)
ERYTHROCYTE [DISTWIDTH] IN BLOOD BY AUTOMATED COUNT: 13.2 % (ref 11.5–14.5)
GLOBULIN SER-MCNC: 3 G/L
GLUCOSE SERPL-MCNC: 134 MG/DL (ref 74–106)
HCG UR QL: NEGATIVE
HCT VFR BLD CALC: 36.9 % (ref 36–48)
HGB BLD-MCNC: 12.5 G/DL (ref 12–16)
IMM GRANULOCYTES NFR BLD: 0.3 % (ref 0–5)
KETONES UR STRIP-MCNC: (no result) MG/DL
LIPASE SERPL-CCNC: 66 U/L (ref 73–393)
LYMPHOCYTES # BLD: 0.44 10X3/UL (ref 1.18–3.74)
LYMPHOCYTES NFR BLD AUTO: 4.5 % (ref 15–50)
MCH RBC QN AUTO: 29.7 PG (ref 26–34)
MCHC RBC AUTO-ENTMCNC: 33.9 G/DL (ref 31–37)
MCV RBC: 87.6 FL (ref 80–100)
MONOCYTES NFR BLD: 4.8 % (ref 2–11)
NEUTROPHILS # BLD: 8.75 10X3/UL (ref 1.56–6.13)
NEUTROPHILS NFR BLD AUTO: 90.4 % (ref 40–80)
NITRITE UR-MCNC: NEGATIVE MG/ML
OPIATES UR QL SCN: NEGATIVE QUAL
OSMOLALITY SERPL CALC.SUM OF ELEC: 280 MOSM/KG (ref 275–300)
PCP UR QL SCN: NEGATIVE QUAL
PH UR STRIP: 8 [PH] (ref 5–8)
PLATELET # BLD: 207 10X3/UL (ref 130–400)
PMV BLD AUTO: 11.9 FL (ref 7.4–10.4)
POTASSIUM SERPL-SCNC: 3.2 MMOL/L (ref 3.5–5.1)
PROT SERPL-MCNC: 7.3 G/DL (ref 6.4–8.2)
RBC # BLD AUTO: 4.21 10X6/UL (ref 4–5.4)
SODIUM SERPL-SCNC: 140 MMOL/L (ref 136–145)
SQUAMOUS #/AREA URNS HPF: (no result) HPF (ref 0–4)
UROBILINOGEN UR-MCNC: NORMAL MG/DL (ref ?–2)
WBC # BLD AUTO: 9.7 10X3/UL (ref 4.8–10.8)
WBC #/AREA URNS HPF: (no result) HPF (ref 0–4)

## 2021-06-06 ENCOUNTER — HOSPITAL ENCOUNTER (INPATIENT)
Dept: HOSPITAL 84 - D.ER | Age: 41
LOS: 1 days | Discharge: HOME | DRG: 392 | End: 2021-06-07
Attending: FAMILY MEDICINE | Admitting: FAMILY MEDICINE
Payer: MEDICARE

## 2021-06-06 VITALS — HEIGHT: 69 IN | WEIGHT: 125.26 LBS | BODY MASS INDEX: 18.55 KG/M2

## 2021-06-06 DIAGNOSIS — M79.7: ICD-10-CM

## 2021-06-06 DIAGNOSIS — E87.2: ICD-10-CM

## 2021-06-06 DIAGNOSIS — K31.84: Primary | ICD-10-CM

## 2021-06-06 LAB
ANION GAP SERPL CALC-SCNC: 22 MMOL/L (ref 8–16)
BACTERIA #/AREA URNS HPF: (no result) HPF
BASOPHILS NFR BLD AUTO: 0.1 % (ref 0–2)
BILIRUB SERPL-MCNC: NEGATIVE MG/DL
BUN SERPL-MCNC: 20 MG/DL (ref 7–18)
CALCIUM SERPL-MCNC: 9.5 MG/DL (ref 8.5–10.1)
CHLORIDE SERPL-SCNC: 104 MMOL/L (ref 98–107)
CO2 SERPL-SCNC: 20.3 MMOL/L (ref 21–32)
CREAT SERPL-MCNC: 1.1 MG/DL (ref 0.6–1.3)
EOSINOPHIL NFR BLD: 0 % (ref 0–7)
ERYTHROCYTE [DISTWIDTH] IN BLOOD BY AUTOMATED COUNT: 13.1 % (ref 11.5–14.5)
GLUCOSE SERPL-MCNC: 163 MG/DL (ref 74–106)
HCG UR QL: NEGATIVE
HCT VFR BLD CALC: 43.1 % (ref 36–48)
HGB BLD-MCNC: 14.2 G/DL (ref 12–16)
KETONES UR STRIP-MCNC: (no result) MG/DL
LYMPHOCYTES # BLD: 0.5 10X3/UL (ref 1.18–3.74)
LYMPHOCYTES NFR BLD AUTO: 2.3 % (ref 15–50)
MCH RBC QN AUTO: 28.4 PG (ref 26–34)
MCHC RBC AUTO-ENTMCNC: 32.8 G/DL (ref 31–37)
MCV RBC: 86.6 FL (ref 80–100)
MONOCYTES NFR BLD: 3.1 % (ref 2–11)
NEUTROPHILS # BLD: 19.8 10X3/UL (ref 1.56–6.13)
NEUTROPHILS NFR BLD AUTO: 94.5 % (ref 40–80)
NITRITE UR-MCNC: NEGATIVE MG/ML
OSMOLALITY SERPL CALC.SUM OF ELEC: 291 MOSM/KG (ref 275–300)
PH UR STRIP: 5 [PH] (ref 5–8)
PLATELET # BLD: 254 10X3/UL (ref 130–400)
PMV BLD AUTO: 10.4 FL (ref 7.4–10.4)
POTASSIUM SERPL-SCNC: 3.3 MMOL/L (ref 3.5–5.1)
RBC # BLD AUTO: 4.98 10X6/UL (ref 4–5.4)
SODIUM SERPL-SCNC: 143 MMOL/L (ref 136–145)
SQUAMOUS #/AREA URNS HPF: (no result) HPF (ref 0–4)
UROBILINOGEN UR-MCNC: NORMAL MG/DL (ref ?–2)
WBC # BLD AUTO: 21 10X3/UL (ref 4.8–10.8)
WBC #/AREA URNS HPF: (no result) HPF (ref 0–4)

## 2021-06-07 VITALS — DIASTOLIC BLOOD PRESSURE: 70 MMHG | SYSTOLIC BLOOD PRESSURE: 115 MMHG

## 2021-06-07 VITALS — DIASTOLIC BLOOD PRESSURE: 68 MMHG | SYSTOLIC BLOOD PRESSURE: 103 MMHG

## 2021-06-07 VITALS — DIASTOLIC BLOOD PRESSURE: 82 MMHG | SYSTOLIC BLOOD PRESSURE: 149 MMHG

## 2021-06-07 VITALS — DIASTOLIC BLOOD PRESSURE: 70 MMHG | SYSTOLIC BLOOD PRESSURE: 110 MMHG

## 2021-06-07 VITALS — SYSTOLIC BLOOD PRESSURE: 118 MMHG | DIASTOLIC BLOOD PRESSURE: 76 MMHG

## 2021-06-07 LAB
ALBUMIN SERPL-MCNC: 3.9 G/DL (ref 3.4–5)
ALBUMIN SERPL-MCNC: 4.9 G/DL (ref 3.4–5)
ALP SERPL-CCNC: 72 U/L (ref 30–120)
ALP SERPL-CCNC: 89 U/L (ref 30–120)
ALT SERPL-CCNC: 19 U/L (ref 10–68)
ALT SERPL-CCNC: 19 U/L (ref 10–68)
AMYLASE SERPL-CCNC: 738 U/L (ref 25–115)
ANION GAP SERPL CALC-SCNC: 14.7 MMOL/L (ref 8–16)
BASOPHILS NFR BLD AUTO: 0.1 % (ref 0–2)
BILIRUB SERPL-MCNC: 1.87 MG/DL (ref 0.2–1.3)
BILIRUB SERPL-MCNC: 1.96 MG/DL (ref 0.2–1.3)
BUN SERPL-MCNC: 13 MG/DL (ref 7–18)
CALCIUM SERPL-MCNC: 8.4 MG/DL (ref 8.5–10.1)
CHLORIDE SERPL-SCNC: 109 MMOL/L (ref 98–107)
CO2 SERPL-SCNC: 23.7 MMOL/L (ref 21–32)
CREAT SERPL-MCNC: 0.7 MG/DL (ref 0.6–1.3)
EOSINOPHIL NFR BLD: 0 % (ref 0–7)
ERYTHROCYTE [DISTWIDTH] IN BLOOD BY AUTOMATED COUNT: 13 % (ref 11.5–14.5)
GLOBULIN SER-MCNC: 3 G/L
GLOBULIN SER-MCNC: 3.6 G/L
GLUCOSE SERPL-MCNC: 117 MG/DL (ref 74–106)
HCT VFR BLD CALC: 35.5 % (ref 36–48)
HGB BLD-MCNC: 11.6 G/DL (ref 12–16)
LIPASE SERPL-CCNC: 40 U/L (ref 73–393)
LYMPHOCYTES # BLD: 1 10X3/UL (ref 1.18–3.74)
LYMPHOCYTES NFR BLD AUTO: 8.4 % (ref 15–50)
MCH RBC QN AUTO: 28.4 PG (ref 26–34)
MCHC RBC AUTO-ENTMCNC: 32.7 G/DL (ref 31–37)
MCV RBC: 87 FL (ref 80–100)
MONOCYTES NFR BLD: 8 % (ref 2–11)
NEUTROPHILS # BLD: 10.2 10X3/UL (ref 1.56–6.13)
NEUTROPHILS NFR BLD AUTO: 83.5 % (ref 40–80)
OSMOLALITY SERPL CALC.SUM OF ELEC: 287 MOSM/KG (ref 275–300)
PLATELET # BLD: 206 10X3/UL (ref 130–400)
PMV BLD AUTO: 10.6 FL (ref 7.4–10.4)
POTASSIUM SERPL-SCNC: 3.4 MMOL/L (ref 3.5–5.1)
PROT SERPL-MCNC: 6.9 G/DL (ref 6.4–8.2)
PROT SERPL-MCNC: 8.5 G/DL (ref 6.4–8.2)
RBC # BLD AUTO: 4.08 10X6/UL (ref 4–5.4)
SODIUM SERPL-SCNC: 144 MMOL/L (ref 136–145)
TROPONIN I SERPL-MCNC: < 0.017 NG/ML (ref 0–0.06)
WBC # BLD AUTO: 12.2 10X3/UL (ref 4.8–10.8)

## 2021-06-07 NOTE — MORECARE
CASE MANAGEMENT DISCHARGE SUMMARY
 
 
PATIENT: JAS MEJÍA                     UNIT: [id number]
ACCOUNT#: [id number]                       ADM DATE: 21
AGE: 41     : 84  SEX: F            ROOM/BED: D.2130    
AUTHOR: HERSONDOC                             PHYSICIAN:                               
 
REFERRING PHYSICIAN: JONI HICKMAN MD           
DATE OF SERVICE: 21
Case Management Discharge Planning Summary
 
 
 
 
 
DCP REVIEW SUMMARY
ANTICIPATED D/C DATE: 2021
EXPECTED LOS : 1
CASE STATUS:  DCP Initiated
INITIAL REVIEW:  2021
INITIAL REVIEWER:   Crissy Yu
FINAL DISCHARGE DISPOSITION:  : 
FINAL REVIEWER:  
FINAL REVIEW DATE: 
 
  
 
DCP Focus Questions & Answers
 
 
QUESTION: ANSWER
 : 
 
 
 
 
 
PATIENT:  JAS MEJÍA
ENCOUNTER:  [id number]
MEDICAL RECORD#:  [id number]
ADMISSION DATE:  2021
DISCHARGE DATE:  
ATTENDING MD:  
:   1980-Mar-23
AGE:  41
MARITAL STATUS:   M
DC PLAN ID:  [id number]
 
FACILITY:   [facility]
PRINTED ON: 21  16:00  CT
 
 
 
 
 
 
 
 
**All edits/amendments must be made on the electronic document**
 
DICTATION DATE: 21 1600     : DM  21 1600     
RPT#: [id number]                                DC DATE:        
                                               STATUS: ADM IN  
[facility]
 [place] [address]
***END OF REPORT***

## 2021-06-07 NOTE — MORECARE
CASE MANAGEMENT DISCHARGE SUMMARY
 
 
PATIENT: JAS LAM                     UNIT: [id number]
ACCOUNT#: [id number]                       ADM DATE: 21
AGE: 41     : 84  SEX: F            ROOM/BED: D.2130    
AUTHOR: HERSON,DOC                             PHYSICIAN:                               
 
REFERRING PHYSICIAN: JONI HICKMAN MD           
DATE OF SERVICE: 21
Case Management Discharge Planning Summary
 
 
COMMENTS 
ENTERED DATE:  21  16:03 CT
COMMENT TYPE:  Discharge Planning
REVIEWER: Crissy Yu
CM met with patient to complete discharge planning assessment and offer 
availability of needed services. Patient states that she lives independently 
at home with her spouse prior to admission. Pt verified that home 
environment is safe and has electricity and running water. Patient denies 
need for transportation and state that they have funds for services and 
medications if needed. PCP is Dr Schneider and patient uses [facility] for her 
pharmacy. CM offered and discussed home health, rehab services, and need for 
any medical equipment. Patient did not express need for offered services at 
this time. Transportation home will be provided by her spouse Isreal Lam 
([phone]) when discharged.  Patient voices not needs at this time.
 
 
DCP REVIEW SUMMARY
ANTICIPATED D/C DATE: 2021
EXPECTED LOS : 1
CASE STATUS:  DCP Initiated
INITIAL REVIEW:  2021
INITIAL REVIEWER:   Crissy Yu
FINAL DISCHARGE DISPOSITION:  : 
FINAL REVIEWER:  
FINAL REVIEW DATE: 
 
  
 
[facility] Focus Questions & Answers
 
DCP Screen
QUESTION: ANSWER
High Risk Factors: : None
Walking limitation:  Patient stated self rated walking limitation present? : 
No
Age: : 20 - 46
Prior living environment: : Lives with others
 
Disability ranking: : Grade 1:  No significant disability
 
 
 
DCP Evaluation
QUESTION: ANSWER
Patient and/or caregiver agree upon recommended discharge plan? : Yes
Family / Caregiver's ability to cope with chronic illness: : a. Adequate 
(ability to meet patient's medical needs, ensures patient attends medical 
appts.)
Patient's current cognitive status: : *Oriented to person, place, situation, 
time and present
Patient's ability to cope with chronic illness : d. No chronic illness
Does the patient have the ability to pay for or attain post discharge needs 
/ services? : Yes
Functional screen assessment: : Basic needs can adequately be met by self
Family / Caregiver's ability to cope with chronic illness: : a. Adequate 
(ability to meet patient's medical needs, ensures patient attends medical 
appts.)
Physical Status: : Independent with ADL's
Equipment needed for post hospitalization: : None
Is there a likelihood that the patient will require additional services to 
return to the preadmission environment? : Yes
Living Arrangements: : Home with Spouse/Significant Other
Results of this evaluation have been discussed with: : Patient
Patient with capacity for self-care or can be cared for in same environment 
as prior to hospitalization? : Yes
Baseline cognitive status: : *Oriented to person, place, situation, time and 
present
Physical environment modification needed / anticipated for discharge: : N/A
Medication Management: : Patient states can read and understand medication 
labels
Medication Management: : Patient states they do have transportation to pick 
up medications
Medication Management: : Patient states can afford medications
Planned post hospital services available for patient? : N/A
Pharmacy name(s): : [facility]
Planned post hospital services covered by insurance plan? : N/A
Does Patient have transportation to get home and to follow-up medical 
appointments when discharged from the hospital? : Yes
Would patient like to participate in any Care Coordination programs (if 
applicable): : Not applicable
Does the patient have electricity at home? : Yes
Does the patient have running water in their house? : Yes
Equipment in use: : None
Mental health screen: : No mental health history
Psychosocial status: : Independent adult (18-64)
Abuse/Neglect: : None
Resources / Services in place: : None
 
 
 
 
DCP Re-evaluation
QUESTION: ANSWER
Would patient like to participate in any Care Coordination programs (if 
applicable): : Not applicable
 
 
 
 
 
 
 
PATIENT:  JAS LAM
ENCOUNTER:  [id number]
MEDICAL RECORD#:  [id number]
ADMISSION DATE:  2021
DISCHARGE DATE:  2021
ATTENDING MD:  
AUGUSTINA:   1980-Mar-23
AGE:  41
MARITAL STATUS:   M
DC PLAN ID:  [id number]
FACILITY:   [facility]
PRINTED ON: 21  18:45  CT
 
 
 
 
 
 
 
 
**All edits/amendments must be made on the electronic document**
 
DICTATION DATE: 21     : JUSTYNA  21     
RPT#: [id number]                                DC DATE:21
                                               STATUS: DIS IN  
[facility]
[address] [place] [address]
***END OF REPORT***

## 2021-06-07 NOTE — NUR
WHEN I WAS DISCHARGING PATIENT, I NOTICED SHE HAD A HOODIE ON WITH A CANNABIS
LEAF ON IT. I TOLD HER, "I MEAN NO DISREPECT BUT YOU KNOW THAT SMOKING CAN
CAUSE THE GASTROPARESIS". SHE STATES THAT SHE HAS A MEDICAL CARD FOR IT AND
THAT THE DOCTORS HAVE TOLD HER THIS CAN CAUSE IT. AGAIN, I TOLD HER THAT I
MEANT NO DISREPECT WHEN I WAS ASKING HER.

## 2021-06-07 NOTE — MORECARE
CASE MANAGEMENT DISCHARGE SUMMARY
 
 
PATIENT: JAS LAM                     UNIT: [id number]
ACCOUNT#: [id number]                       ADM DATE: 21
AGE: 41     : 84  SEX: F            ROOM/BED: D.2130    
AUTHOR: HERSON,DOC                             PHYSICIAN:                               
 
REFERRING PHYSICIAN: JONI HICKMAN MD           
DATE OF SERVICE: 21
Case Management Discharge Planning Summary
 
 
COMMENTS 
ENTERED DATE:  21  16:03 CT
COMMENT TYPE:  Discharge Planning
REVIEWER: Crissy Yu
CM met with patient to complete discharge planning assessment and offer 
availability of needed services. Patient states that she lives independently 
at home with her spouse prior to admission. Pt verified that home 
environment is safe and has electricity and running water. Patient denies 
need for transportation and state that they have funds for services and 
medications if needed. PCP is Dr Schneider and patient uses [facility] for her 
pharmacy. CM offered and discussed home health, rehab services, and need for 
any medical equipment. Patient did not express need for offered services at 
this time. Transportation home will be provided by her spouse Isreal Lam 
([phone]) when discharged.  Patient voices not needs at this time.
 
 
DCP REVIEW SUMMARY
ANTICIPATED D/C DATE: 2021
EXPECTED LOS : 1
CASE STATUS:  DCP Initiated
INITIAL REVIEW:  2021
INITIAL REVIEWER:   Crissy Yu
FINAL DISCHARGE DISPOSITION:  : 
FINAL REVIEWER:  
FINAL REVIEW DATE: 
 
  
 
[facility] Focus Questions & Answers
 
DCP Screen
QUESTION: ANSWER
High Risk Factors: : None
Walking limitation:  Patient stated self rated walking limitation present? : 
No
Age: : 20 - 46
Prior living environment: : Lives with others
 
Disability ranking: : Grade 1:  No significant disability
 
 
 
DCP Evaluation
QUESTION: ANSWER
Patient and/or caregiver agree upon recommended discharge plan? : Yes
Family / Caregiver's ability to cope with chronic illness: : a. Adequate 
(ability to meet patient's medical needs, ensures patient attends medical 
appts.)
Patient's current cognitive status: : *Oriented to person, place, situation, 
time and present
Patient's ability to cope with chronic illness : d. No chronic illness
Does the patient have the ability to pay for or attain post discharge needs 
/ services? : Yes
Functional screen assessment: : Basic needs can adequately be met by self
Family / Caregiver's ability to cope with chronic illness: : a. Adequate 
(ability to meet patient's medical needs, ensures patient attends medical 
appts.)
Physical Status: : Independent with ADL's
Equipment needed for post hospitalization: : None
Is there a likelihood that the patient will require additional services to 
return to the preadmission environment? : Yes
Living Arrangements: : Home with Spouse/Significant Other
Results of this evaluation have been discussed with: : Patient
Patient with capacity for self-care or can be cared for in same environment 
as prior to hospitalization? : Yes
Baseline cognitive status: : *Oriented to person, place, situation, time and 
present
Physical environment modification needed / anticipated for discharge: : N/A
Medication Management: : Patient states can read and understand medication 
labels
Medication Management: : Patient states they do have transportation to pick 
up medications
Medication Management: : Patient states can afford medications
Planned post hospital services available for patient? : N/A
Pharmacy name(s): : [facility]
Planned post hospital services covered by insurance plan? : N/A
Does Patient have transportation to get home and to follow-up medical 
appointments when discharged from the hospital? : Yes
Would patient like to participate in any Care Coordination programs (if 
applicable): : Not applicable
Does the patient have electricity at home? : Yes
Does the patient have running water in their house? : Yes
Equipment in use: : None
Mental health screen: : No mental health history
Psychosocial status: : Independent adult (18-64)
Abuse/Neglect: : None
Resources / Services in place: : None
 
 
 
 
DCP Re-evaluation
QUESTION: ANSWER
Would patient like to participate in any Care Coordination programs (if 
applicable): : Not applicable
 
 
 
 
 
 
 
PATIENT:  JAS LAM
ENCOUNTER:  [id number]
MEDICAL RECORD#:  [id number]
ADMISSION DATE:  2021
DISCHARGE DATE:  
ATTENDING MD:  
AUGUSTINA:   1980-Mar-23
AGE:  41
MARITAL STATUS:   M
DC PLAN ID:  [id number]
FACILITY:   [facility]
PRINTED ON: 21  16:14  CT
 
 
 
 
 
 
 
 
**All edits/amendments must be made on the electronic document**
 
DICTATION DATE: 21     : JUSTYNA  21     
RPT#: [id number]                                DC DATE:        
                                               STATUS: ADM IN  
[facility]
[address] [place] [address]
***END OF REPORT***

## 2021-06-07 NOTE — NUR
RECEIVE SHIFT REPORT. RESTING IN BED. AROUSES TO VOICE. DENIES ANY NEEDS AT
THIS TIME. STATES SHE IS FEELING A LITTLE BETTER AT THIS TIME. NO N/V PRESENT
AT THE TIME. NPO. WILL CONTINUE POC AND SAFETY PRECAUTIONS. CALL LIGHT IN
REACH.

## 2021-06-07 NOTE — NUR
ADMISSION ASSESSMENT, HISTORY AND HOME MED COMPLETED.  VSS.  ALERT AND
ORIENTED TO PERSON, PLACE AND TIME.  ALDANA.  IV TO R WRIST WITH ZOFRAN DRIP
INFUSING AT 4.6CC/HR.  IV PATENT. LUNGS CTA. HYPOACTIVE BS NOTED.  KCL 40 MEQ
POWDER IN 210CC OF WATER GIVEN FOR K+ OF 3.3. PT VOMITED GREEN COLORED EMESIS
5 MINUTES AFTER DRINKING SOME OF THE K+.  SR UP X1, CALL LIGHT WITHIN REACH.

## 2021-06-07 NOTE — NUR
PATIENT STATES THAT SHE TOLERATED HER SUPPER WITH NO NAUSEA. WILL DISCHARGE
HOME. IV CATH REMOVED WITH TIP INTACT. VERBRAL AND WRITTEN DISCHARGE
INSTRUCTIONS GIVEN TO PATIENT. DISCHARGED HOME VIA WHEELCHAIR.

## 2021-06-08 ENCOUNTER — HOSPITAL ENCOUNTER (EMERGENCY)
Dept: HOSPITAL 84 - D.ER | Age: 41
Discharge: LEFT BEFORE DISCHARGE | End: 2021-06-08
Payer: MEDICARE

## 2021-06-08 VITALS — BODY MASS INDEX: 18.5 KG/M2

## 2021-06-08 DIAGNOSIS — R11.2: Primary | ICD-10-CM

## 2021-06-09 ENCOUNTER — HOSPITAL ENCOUNTER (EMERGENCY)
Dept: HOSPITAL 84 - OBSVTIME | Age: 41
LOS: 1 days | Discharge: LEFT BEFORE DISCHARGE | End: 2021-06-10
Payer: MEDICARE

## 2021-06-09 VITALS — BODY MASS INDEX: 18.55 KG/M2 | WEIGHT: 125.26 LBS | HEIGHT: 69 IN

## 2021-06-09 VITALS — SYSTOLIC BLOOD PRESSURE: 126 MMHG | DIASTOLIC BLOOD PRESSURE: 80 MMHG

## 2021-06-09 DIAGNOSIS — R62.7: ICD-10-CM

## 2021-06-09 DIAGNOSIS — E87.6: ICD-10-CM

## 2021-06-09 DIAGNOSIS — R79.89: ICD-10-CM

## 2021-06-09 DIAGNOSIS — R11.2: Primary | ICD-10-CM

## 2021-06-09 DIAGNOSIS — M79.7: ICD-10-CM

## 2021-06-09 DIAGNOSIS — B17.9: ICD-10-CM

## 2021-06-09 LAB
ALBUMIN SERPL-MCNC: 4.9 G/DL (ref 3.4–5)
ALP SERPL-CCNC: 88 U/L (ref 30–120)
ALT SERPL-CCNC: 147 U/L (ref 10–68)
AMPHETAMINES UR QL SCN: NEGATIVE QUAL
ANION GAP SERPL CALC-SCNC: 16.7 MMOL/L (ref 8–16)
BARBITURATES UR QL SCN: NEGATIVE QUAL
BASOPHILS NFR BLD AUTO: 0.2 % (ref 0–2)
BENZODIAZ UR QL SCN: NEGATIVE QUAL
BILIRUB SERPL-MCNC: 2.72 MG/DL (ref 0.2–1.3)
BILIRUB SERPL-MCNC: NEGATIVE MG/DL
BUN SERPL-MCNC: 13 MG/DL (ref 7–18)
BZE UR QL SCN: NEGATIVE QUAL
CALCIUM SERPL-MCNC: 9.3 MG/DL (ref 8.5–10.1)
CANNABINOIDS UR QL SCN: POSITIVE QUAL
CHLORIDE SERPL-SCNC: 102 MMOL/L (ref 98–107)
CK SERPL-CCNC: 53 UL (ref 21–215)
CO2 SERPL-SCNC: 25 MMOL/L (ref 21–32)
CREAT SERPL-MCNC: 1 MG/DL (ref 0.6–1.3)
EOSINOPHIL NFR BLD: 0.1 % (ref 0–7)
ERYTHROCYTE [DISTWIDTH] IN BLOOD BY AUTOMATED COUNT: 13.1 % (ref 11.5–14.5)
GLOBULIN SER-MCNC: 3.7 G/L
GLUCOSE SERPL-MCNC: 121 MG/DL (ref 74–106)
HCG UR QL: NEGATIVE
HCT VFR BLD CALC: 42 % (ref 36–48)
HGB BLD-MCNC: 13.8 G/DL (ref 12–16)
KETONES UR STRIP-MCNC: (no result) MG/DL
LIPASE SERPL-CCNC: 45 U/L (ref 73–393)
LYMPHOCYTES # BLD: 1.7 10X3/UL (ref 1.18–3.74)
LYMPHOCYTES NFR BLD AUTO: 13.9 % (ref 15–50)
MAGNESIUM SERPL-MCNC: 2.2 MG/DL (ref 1.8–2.4)
MCH RBC QN AUTO: 28.2 PG (ref 26–34)
MCHC RBC AUTO-ENTMCNC: 32.8 G/DL (ref 31–37)
MCV RBC: 86 FL (ref 80–100)
MONOCYTES NFR BLD: 9.5 % (ref 2–11)
NEUTROPHILS # BLD: 9.6 10X3/UL (ref 1.56–6.13)
NEUTROPHILS NFR BLD AUTO: 76.3 % (ref 40–80)
NITRITE UR-MCNC: NEGATIVE MG/ML
OPIATES UR QL SCN: NEGATIVE QUAL
OSMOLALITY SERPL CALC.SUM OF ELEC: 281 MOSM/KG (ref 275–300)
PCP UR QL SCN: NEGATIVE QUAL
PH UR STRIP: 6 [PH] (ref 5–8)
PLATELET # BLD: 254 10X3/UL (ref 130–400)
PMV BLD AUTO: 10.8 FL (ref 7.4–10.4)
POTASSIUM SERPL-SCNC: 2.7 MMOL/L (ref 3.5–5.1)
PROT SERPL-MCNC: 8.6 G/DL (ref 6.4–8.2)
RBC # BLD AUTO: 4.89 10X6/UL (ref 4–5.4)
SODIUM SERPL-SCNC: 141 MMOL/L (ref 136–145)
TSH SERPL-ACNC: 2.23 UIU/ML (ref 0.36–3.74)
UROBILINOGEN UR-MCNC: 4 MG/DL (ref ?–2)
WBC # BLD AUTO: 12.5 10X3/UL (ref 4.8–10.8)

## 2021-06-09 NOTE — NUR
PATIENT IN W C/O N/V X 3 DAYS, STATES SHE HAS HAD TRIPS HERE AND STILL ISN'T
FEELING ANY BETTER. ACTIVLY HAVING N/V, ABD PAIN MORE ON THE LEFT, TENDER TO
THE TOUCH,

## 2021-06-10 VITALS — SYSTOLIC BLOOD PRESSURE: 127 MMHG | DIASTOLIC BLOOD PRESSURE: 82 MMHG

## 2021-06-10 NOTE — NUR
PATIENT STATES SHE NEEDS TO LEAVE AMA, I MADE HER AWARE THAT HER K+ WAS LOW AND
SHE NEEDED ANOTHER BAG K+, SHE STATED SHE WOULD FOLLOW UP WITH HER DOCTOR.
STATES HER SON WAS INVOLVED IN AN ACCIDENT AND SHE NEEDED TO GO, HER 
WILL PICK HER UP. KIMI MARY NOTIFIED, DR. BREWER NOTIFIED, MATTIE JANSEN SUPERVISOR
NOTIFIED. DISCUSSED WITH SEGUN JANSEN CHARGE TO COMPLETE C-STAR.

## 2021-06-11 LAB — HCV AB S/CO SERPL IA: <0.1 (ref 0–0.9)
